# Patient Record
Sex: MALE | Race: WHITE | NOT HISPANIC OR LATINO | URBAN - METROPOLITAN AREA
[De-identification: names, ages, dates, MRNs, and addresses within clinical notes are randomized per-mention and may not be internally consistent; named-entity substitution may affect disease eponyms.]

---

## 2017-02-26 DIAGNOSIS — K21.9 GASTROESOPHAGEAL REFLUX DISEASE WITHOUT ESOPHAGITIS: ICD-10-CM

## 2017-02-26 NOTE — TELEPHONE ENCOUNTER
omeprazole (PRILOSEC) 20 MG capsule      Last Written Prescription Date: 09/06/16  Last Fill Quantity: 90 cap,  # refills: 0   Last Office Visit with [facility], [facility] or [facility] prescribing provider: 09/06/16

## 2017-08-12 ENCOUNTER — OFFICE VISIT (OUTPATIENT)
Dept: URGENT CARE | Facility: URGENT CARE | Age: 57
End: 2017-08-12
Payer: MEDICAID

## 2017-08-12 VITALS
SYSTOLIC BLOOD PRESSURE: 142 MMHG | OXYGEN SATURATION: 97 % | TEMPERATURE: 97.8 F | HEIGHT: 71 IN | DIASTOLIC BLOOD PRESSURE: 80 MMHG | BODY MASS INDEX: 29.4 KG/M2 | HEART RATE: 75 BPM | WEIGHT: 210 LBS

## 2017-08-12 DIAGNOSIS — H61.23 BILATERAL IMPACTED CERUMEN: Primary | ICD-10-CM

## 2017-08-12 PROCEDURE — 99213 OFFICE O/P EST LOW 20 MIN: CPT | Performed by: FAMILY MEDICINE

## 2017-08-12 NOTE — PROGRESS NOTES
"SUBJECTIVE:  Hugo Zepeda is a 57 year old male who presents with bilateral ear blockage with hearing loss and discomfort for 2 day(s).  Right ear was about 2-3 days ago while left ear has been 1 month.  Severity: moderate   Timing:gradual onset and worsening  Additional symptoms include none.      History of recurrent otitis: no    Last time had ear wash was 3-4 years ago.    Past Medical History:   Diagnosis Date     Unspecified schizophrenia, chronic condition with acute exacerbation     Micky-Tasks unlimited     Current Outpatient Prescriptions   Medication Sig Dispense Refill     omeprazole (PRILOSEC) 20 MG CR capsule Take 1 capsule (20 mg) by mouth daily 90 capsule 1     TRILAFON 8 MG OR TABS 52mg at hs       RISPERDAL OR 3 mg daily       COGENTIN 2 MG OR TABS 1 TABLET DAILY 30 0     Social History   Substance Use Topics     Smoking status: Former Smoker     Packs/day: 1.00     Years: 20.00     Types: Cigarettes     Quit date: 10/1/2011     Smokeless tobacco: Never Used     Alcohol use No       ROS:   CONSTITUTIONAL:NEGATIVE for fever, chills, change in weight  ENT/MOUTH: NEGATIVE for ear, mouth and throat problems and POSITIVE for earache and hearing loss  RESP:NEGATIVE for significant cough or SOB  CV: NEGATIVE for chest pain, palpitations or peripheral edema  GI: NEGATIVE for nausea, abdominal pain, heartburn, or change in bowel habits    OBJECTIVE:  /80 (BP Location: Left arm, Patient Position: Chair, Cuff Size: Adult Large)  Pulse 75  Temp 97.8  F (36.6  C) (Oral)  Ht 5' 10.5\" (1.791 m)  Wt 210 lb (95.3 kg)  SpO2 97%  BMI 29.71 kg/m2   EXAM:  The right TM is normal: no effusions, no erythema, and normal landmarks after removal of cerumen    The right auditory canal is obstructed with cerumen  The left TM is normal: no effusions, no erythema, and normal landmarks after removal of cerumen  The left auditory canal is obstructed with cerumen  Oropharynx exam is normal: no lesions, erythema, " adenopathy or exudate.  GENERAL: no acute distress  SKIN: no suspicious lesions or rashes   PSYCH: mentation appears normal and affect normal/bright    ASSESSMENT/PLAN:  (H61.23) Bilateral impacted cerumen  (primary encounter diagnosis)  Comment: successful irrigation  Plan: bilateral ear irrigation      Bilateral impacted cerumen, successful irrigation by nursing staff.  Patient reports improvement of symptoms.  Encourage to refrain from using Q-tips.    Return to clinic if any further concerns.    Eulogio Jurado MD  August 12, 2017 11:17 AM

## 2017-08-12 NOTE — MR AVS SNAPSHOT
"              After Visit Summary   2017    Hugo Zepeda    MRN: [id number]           Patient Information     Date Of Birth          1960        Visit Information        Provider Department      2017 10:40 AM Eulogio Jurado MD [facility]        Today's Diagnoses     Bilateral impacted cerumen    -  1       Follow-ups after your visit        Follow-up notes from your care team     Return if symptoms worsen or fail to improve.      Who to contact     If you have questions or need follow up information about today's clinic visit or your schedule please contact [facility] directly at [phone].  Normal or non-critical lab and imaging results will be communicated to you by [employer], letter or phone within 4 business days after the clinic has received the results. If you do not hear from us within 7 days, please contact the clinic through [employer] or phone. If you have a critical or abnormal lab result, we will notify you by phone as soon as possible.  Submit refill requests through [employer] or call your pharmacy and they will forward the refill request to us. Please allow 3 business days for your refill to be completed.          Additional Information About Your Visit        MyChart Information     [employer] lets you send messages to your doctor, view your test results, renew your prescriptions, schedule appointments and more. To sign up, go to [email or web address] . Click on \"Log in\" on the left side of the screen, which will take you to the Welcome page. Then click on \"Sign up Now\" on the right side of the page.     You will be asked to enter the access code listed below, as well as some personal information. Please follow the directions to create your username and password.     Your access code is: [email or web address]  Expires: 11/10/2017 11:18 AM     Your access code will  in 90 days. If you need help or a new code, please call your [place] clinic or " "[phone].        Care EveryWhere ID     This is your Care EveryWhere ID. This could be used by other organizations to access your [facility] medical records  [id number]        Your Vitals Were     Pulse Temperature Height Pulse Oximetry BMI (Body Mass Index)       75 97.8  F (36.6  C) (Oral) 5' 10.5\" (1.791 m) 97% 29.71 kg/m2        Blood Pressure from Last 3 Encounters:   08/12/17 142/80   11/10/16 (!) 118/95   09/06/16 122/72    Weight from Last 3 Encounters:   08/12/17 210 lb (95.3 kg)   11/10/16 204 lb (92.5 kg)   09/06/16 205 lb 12.8 oz (93.4 kg)              Today, you had the following     No orders found for display       Primary Care Provider Office Phone # Fax #    Arsh Sommer [phone]       [address] [place] [address]        Equal Access to Services     [facility]: Hadii nancy ku hadasho Soomaali, waaxda luqadaha, qaybta kaalmada adeegyada, waxfausto balbuena . So [facility] [phone].    ATENCIÓN: Si habla español, tiene a cardozo disposición servicios gratuitos de asistencia lingüística. Llame al [phone].    We comply with applicable federal civil rights laws and [place] laws. We do not discriminate on the basis of race, color, national origin, age, disability sex, sexual orientation or gender identity.            Thank you!     Thank you for choosing [facility]  for your care. Our goal is always to provide you with excellent care. Hearing back from our patients is one way we can continue to improve our services. Please take a few minutes to complete the written survey that you may receive in the mail after your visit with us. Thank you!             Your Updated Medication List - Protect others around you: Learn how to safely use, store and throw away your medicines at www.disposemymeds.org.          This list is accurate as of: 8/12/17 11:18 AM.  Always use your most recent med list.                   Brand Name " Dispense Instructions for use Diagnosis    COGENTIN 2 MG Tabs     30    1 TABLET DAILY        omeprazole 20 MG CR capsule    priLOSEC    90 capsule    Take 1 capsule (20 mg) by mouth daily    Gastroesophageal reflux disease without esophagitis       RISPERDAL PO      3 mg daily        TRILAFON 8 MG Tabs      52mg at hs

## 2017-08-12 NOTE — NURSING NOTE
"Hugo Zepead;   Chief Complaint   Patient presents with     Ear Problem     bilateral ear plugging, unable to hear      Urgent Care     Initial /80 (BP Location: Left arm, Patient Position: Chair, Cuff Size: Adult Large)  Pulse 75  Temp 97.8  F (36.6  C) (Oral)  Ht 5' 10.5\" (1.791 m)  Wt 210 lb (95.3 kg)  SpO2 97%  BMI 29.71 kg/m2 Estimated body mass index is 29.71 kg/(m^2) as calculated from the following:    Height as of this encounter: 5' 10.5\" (1.791 m).    Weight as of this encounter: 210 lb (95.3 kg)..  BP completed using cuff size regular.  Miranda Clarke R.N.  "

## 2017-08-24 DIAGNOSIS — K21.9 GASTROESOPHAGEAL REFLUX DISEASE WITHOUT ESOPHAGITIS: ICD-10-CM

## 2017-08-25 NOTE — TELEPHONE ENCOUNTER
omeprazole      Last Written Prescription Date: 02/27/17  Last Fill Quantity: 90,  # refills: 1   Last Office Visit with G, [facility] or [facility] prescribing provider: 09/06/16

## 2017-08-28 NOTE — TELEPHONE ENCOUNTER
Medication is being filled for 1 time refill only due to:  Patient needs to be seen because will be due for office visit.

## 2017-09-24 DIAGNOSIS — K21.9 GASTROESOPHAGEAL REFLUX DISEASE WITHOUT ESOPHAGITIS: ICD-10-CM

## 2017-09-25 NOTE — TELEPHONE ENCOUNTER
omeprazole      Last Written Prescription Date: 08/28/17  Last Fill Quantity: 30,  # refills: 0   Last Office Visit with G, [facility] or [facility] prescribing provider: 09/06/16

## 2017-10-06 ENCOUNTER — RADIANT APPOINTMENT (OUTPATIENT)
Dept: GENERAL RADIOLOGY | Facility: CLINIC | Age: 57
End: 2017-10-06
Attending: PHYSICIAN ASSISTANT
Payer: MEDICAID

## 2017-10-06 ENCOUNTER — OFFICE VISIT (OUTPATIENT)
Dept: FAMILY MEDICINE | Facility: CLINIC | Age: 57
End: 2017-10-06
Payer: MEDICAID

## 2017-10-06 VITALS
RESPIRATION RATE: 16 BRPM | TEMPERATURE: 98.3 F | WEIGHT: 217.5 LBS | HEIGHT: 70 IN | BODY MASS INDEX: 31.14 KG/M2 | SYSTOLIC BLOOD PRESSURE: 122 MMHG | HEART RATE: 92 BPM | DIASTOLIC BLOOD PRESSURE: 84 MMHG | OXYGEN SATURATION: 96 %

## 2017-10-06 DIAGNOSIS — Z23 NEED FOR PROPHYLACTIC VACCINATION AND INOCULATION AGAINST INFLUENZA: ICD-10-CM

## 2017-10-06 DIAGNOSIS — M25.561 ACUTE PAIN OF RIGHT KNEE: Primary | ICD-10-CM

## 2017-10-06 DIAGNOSIS — M17.11 PRIMARY OSTEOARTHRITIS OF RIGHT KNEE: ICD-10-CM

## 2017-10-06 DIAGNOSIS — M25.561 ACUTE PAIN OF RIGHT KNEE: ICD-10-CM

## 2017-10-06 PROCEDURE — 99214 OFFICE O/P EST MOD 30 MIN: CPT | Mod: 25 | Performed by: PHYSICIAN ASSISTANT

## 2017-10-06 PROCEDURE — 90471 IMMUNIZATION ADMIN: CPT | Performed by: PHYSICIAN ASSISTANT

## 2017-10-06 PROCEDURE — 90686 IIV4 VACC NO PRSV 0.5 ML IM: CPT | Performed by: PHYSICIAN ASSISTANT

## 2017-10-06 PROCEDURE — 73562 X-RAY EXAM OF KNEE 3: CPT | Mod: RT

## 2017-10-06 NOTE — NURSING NOTE
"Chief Complaint   Patient presents with     Physical     57 year old presents for physicl exam.       Initial /84 (BP Location: Left arm, Patient Position: Sitting, Cuff Size: Adult Regular)  Pulse 92  Temp 98.3  F (36.8  C)  Resp 16  Ht 5' 10.25\" (1.784 m)  Wt 217 lb 8 oz (98.7 kg)  SpO2 96%  BMI 30.99 kg/m2 Estimated body mass index is 30.99 kg/(m^2) as calculated from the following:    Height as of this encounter: 5' 10.25\" (1.784 m).    Weight as of this encounter: 217 lb 8 oz (98.7 kg).  Medication Reconciliation: complete     Patt Feliciano LPN  "

## 2017-10-06 NOTE — LETTER
[facility]  [address]  [facility] [address]  Phone: [phone]  Fax: [phone]    October 6, 2017        Hugo Zepeda  [address] [place] [address]          To whom it may concern:    RE: Hugo Zepeda    Patient was seen and treated today at our clinic. He states he was out of work due to right knee pain 10/2/17-10/4/17, please excuse him for that absence.     His plan of care and recommendations are attached.     Please contact me for questions or concerns.      Sincerely,        Nicole Joy Siegler, PA-C

## 2017-10-06 NOTE — PROGRESS NOTES
SUBJECTIVE:   CC: Hugo Zeepda is an 57 year old male who presents for preventative health visit.     Due to time constraints patient declined physical but wants evaluation of his right knee    Physical   Annual:     Getting at least 3 servings of Calcium per day::  NO    Bi-annual eye exam::  Yes    Dental care twice a year::  NO    Sleep apnea or symptoms of sleep apnea::  None    Diet::  Regular (no restrictions) and Other    Frequency of exercise::  2-3 days/week    Duration of exercise::  15-30 minutes    Barriers to taking medications::  None    Additional concerns today::  YES    Musculoskeletal problem/pain      Duration: X2-3 months with worsening pain over the past week or so    Description  Location: right knee pain    Intensity:  moderate    Accompanying signs and symptoms: none    History  Previous similar problem: no   Previous evaluation:  none    Precipitating or alleviating factors:  Trauma or overuse: no   Aggravating factors include: standing, walking and climbing stairs    Therapies tried and outcome: ice    Right knee pain worse with walking. When extended it is not painful but is more painful with flexion and weight bearing. He denies notable injury or change in activities. He does not feel popping or clicking or laxity. He hasn't heard any such sounds either. He has not noted swelling, redness, bruising. He denies numbness or tingling surrounding the site. He has no known hx of arthritis or previous xrays or other imaging in his chart.     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         ROS:  Patient denies fever, chills, nausea, vomiting, diarrhea, abdominal pain, chest pain, shortness of breath, headache, dizziness, lightheadedness.      PFSH: non contributory       Patient Active Problem List   Diagnosis     Schizophrenia, chronic condition with acute exacerbation (H)     Hypertriglyceridemia     CKD  "(chronic kidney disease) stage 3, GFR 30-59 ml/min     Family history of diabetes mellitus     ACP (advance care planning)     Non morbid obesity due to excess calories     Current Outpatient Prescriptions   Medication     omeprazole (PRILOSEC) 20 MG CR capsule     TRILAFON 8 MG OR TABS     RISPERDAL OR     COGENTIN 2 MG OR TABS     No current facility-administered medications for this visit.        OBJECTIVE:                                                    /84 (BP Location: Left arm, Patient Position: Sitting, Cuff Size: Adult Regular)  Pulse 92  Temp 98.3  F (36.8  C)  Resp 16  Ht 5' 10.25\" (1.784 m)  Wt 217 lb 8 oz (98.7 kg)  SpO2 96%  BMI 30.99 kg/m2  Body mass index is 30.99 kg/(m^2).  GEN: Alert, oriented x 3, NAD  Non-labored breathing  Pulses equal and palpable in bilateral lower extremities  SKIN: no ecchymosis, erythema, swelling, rash  NEURO: SILT in all nerve distributions of bilateral lower extremities  MS: right lower extremity appears without gross deformity of fracture or dislocation. TTP over the medial joint line and patellar poles, NO TTP over the lateral joint line, popliteal, patellar bursa or tendon, . Full A/PROM without pain of the RLE. Strength 5/5 and equal bilaterally in lower extremities.   Knee: patella tracking normally, no patella apprehension. McMurrays and Apleys tests negative. Lachman negative. Anterior and posterior drawer tests negative. Stable to varus and valgus testing. Quadraceps tendon intact, Patellar tendon intact and without inflammation.     Answers for HPI/ROS submitted by the patient on 10/6/2017   PHQ-2 Score: 0    Diagnostic test results: Xray -  mild DJD changes per my read; no acute findings. Pending radiology     ASSESSMENT/PLAN:                                                          ICD-10-CM    1. Acute pain of right knee M25.561 XR Knee Right 3 Views   2. Primary osteoarthritis of right knee M17.11    3. Need for prophylactic vaccination and " inoculation against influenza Z23 Vaccine Administration, Initial [48450]     FLU VAC, SPLIT VIRUS IM > 3 YO (QUADRIVALENT) [81301]     Vaccine Administration, Initial [10941]     Discussed likely acute irritation of the knee with mild osteoarthritis and recommended activity adjustments as per paperwork- see epic, for a few weeks to allow for things to calm down. Instructed also rest, ice, elevation, wear of brace or ace bandage may also help feel more comfortable. Return if not improving as anticipated.     Nicole Joy Siegler, PA-C  [facility]        Injectable Influenza Immunization Documentation    1.  Is the person to be vaccinated sick today?   No    2. Does the person to be vaccinated have an allergy to a component   of the vaccine?   No    3. Has the person to be vaccinated ever had a serious reaction   to influenza vaccine in the past?   No    4. Has the person to be vaccinated ever had Guillain-Barré syndrome?   No    Form completed by Patt Feliciano LPN

## 2017-10-06 NOTE — MR AVS SNAPSHOT
"              After Visit Summary   10/6/2017    Hugo Zepeda    MRN: [id number]           Patient Information     Date Of Birth          1960        Visit Information        Provider Department      10/6/2017 11:50 AM Siegler, Nicole Joy, PA-C [facility]        Today's Diagnoses     Acute pain of right knee    -  1    Primary osteoarthritis of right knee        Need for prophylactic vaccination and inoculation against influenza           Follow-ups after your visit        Your next 10 appointments already scheduled     Oct 12, 2017  3:00 PM CDT   [facility]    [address] [address] [phone]              Who to contact     If you have questions or need follow up information about today's clinic visit or your schedule please contact [facility] directly at [phone].  Normal or non-critical lab and imaging results will be communicated to you by MyChart, letter or phone within 4 business days after the clinic has received the results. If you do not hear from us within 7 days, please contact the clinic through [employer] or phone. If you have a critical or abnormal lab result, we will notify you by phone as soon as possible.  Submit refill requests through [employer] or call your pharmacy and they will forward the refill request to us. Please allow 3 business days for your refill to be completed.          Additional Information About Your Visit        [employer] Information     [employer] lets you send messages to your doctor, view your test results, renew your prescriptions, schedule appointments and more. To sign up, go to [email or web address] . Click on \"Log in\" on the left side of the screen, which will take you to the Welcome page. Then click on \"Sign up Now\" on the right side of the page.     You will be asked to enter the " "access code listed below, as well as some personal information. Please follow the directions to create your username and password.     Your access code is: [email or web address]  Expires: 11/10/2017 11:18 AM     Your access code will  in 90 days. If you need help or a new code, please call your [place] clinic or [phone].        Care EveryWhere ID     This is your Care EveryWhere ID. This could be used by other organizations to access your [place] medical records  [id number]        Your Vitals Were     Pulse Temperature Respirations Height Pulse Oximetry BMI (Body Mass Index)    92 98.3  F (36.8  C) 16 5' 10.25\" (1.784 m) 96% 30.99 kg/m2       Blood Pressure from Last 3 Encounters:   10/06/17 122/84   17 142/80   11/10/16 (!) 118/95    Weight from Last 3 Encounters:   10/06/17 217 lb 8 oz (98.7 kg)   17 210 lb (95.3 kg)   11/10/16 204 lb (92.5 kg)              We Performed the Following     FLU VAC, SPLIT VIRUS IM > 3 YO (QUADRIVALENT) [27313]     Vaccine Administration, Initial [08398]     Vaccine Administration, Initial [53189]        Primary Care Provider Office Phone # Fax #    Arsh Sommer [phone]       [address] [facility] [address]        Equal Access to Services     NICOLE GALICIA [facility]: Hadii nancy castilloo Soangie, waaxda luqadaha, qaybta kaalmada monica, hollis mercado. So [facility] [phone].    ATENCIÓN: Si habla lizzañol, tiene a cardozo disposición servicios gratuitos de asistencia lingüística. Llame al [phone].    We comply with applicable federal civil rights laws and [place] laws. We do not discriminate on the basis of race, color, national origin, age, disability, sex, sexual orientation, or gender identity.            Thank you!     Thank you for choosing [facility]  for your care. Our goal is always to provide you with excellent care. Hearing back from our patients is one way we can continue " to improve our services. Please take a few minutes to complete the written survey that you may receive in the mail after your visit with us. Thank you!             Your Updated Medication List - Protect others around you: Learn how to safely use, store and throw away your medicines at www.disposemymeds.org.          This list is accurate as of: 10/6/17 11:59 PM.  Always use your most recent med list.                   Brand Name Dispense Instructions for use Diagnosis    COGENTIN 2 MG Tabs     30    1 TABLET DAILY        omeprazole 20 MG CR capsule    priLOSEC    30 capsule    TAKE 1 CAPSULE BY MOUTH ONCE DAILY    Gastroesophageal reflux disease without esophagitis       RISPERDAL PO      3 mg daily        TRILAFON 8 MG Tabs      52mg at hs

## 2017-10-06 NOTE — PROGRESS NOTES
Injectable Influenza Immunization Documentation    1.  Is the person to be vaccinated sick today?   No    2. Does the person to be vaccinated have an allergy to a component   of the vaccine?   No    3. Has the person to be vaccinated ever had a serious reaction   to influenza vaccine in the past?   No    4. Has the person to be vaccinated ever had Guillain-Barré syndrome?   No    Form completed by Rose Manuel CMA

## 2017-10-12 ENCOUNTER — OFFICE VISIT (OUTPATIENT)
Dept: INTERNAL MEDICINE | Facility: CLINIC | Age: 57
End: 2017-10-12
Payer: MEDICAID

## 2017-10-12 VITALS
WEIGHT: 222.4 LBS | TEMPERATURE: 98.4 F | BODY MASS INDEX: 31.68 KG/M2 | SYSTOLIC BLOOD PRESSURE: 120 MMHG | DIASTOLIC BLOOD PRESSURE: 84 MMHG | OXYGEN SATURATION: 96 % | HEART RATE: 93 BPM

## 2017-10-12 DIAGNOSIS — M25.461 EFFUSION OF RIGHT KNEE: ICD-10-CM

## 2017-10-12 DIAGNOSIS — K21.9 GASTROESOPHAGEAL REFLUX DISEASE WITHOUT ESOPHAGITIS: ICD-10-CM

## 2017-10-12 DIAGNOSIS — M17.11 PRIMARY OSTEOARTHRITIS OF RIGHT KNEE: ICD-10-CM

## 2017-10-12 DIAGNOSIS — N18.30 CKD (CHRONIC KIDNEY DISEASE) STAGE 3, GFR 30-59 ML/MIN (H): ICD-10-CM

## 2017-10-12 DIAGNOSIS — F20.9 SCHIZOPHRENIA, CHRONIC CONDITION WITH ACUTE EXACERBATION (H): ICD-10-CM

## 2017-10-12 DIAGNOSIS — Z00.00 ROUTINE GENERAL MEDICAL EXAMINATION AT A HEALTH CARE FACILITY: Primary | ICD-10-CM

## 2017-10-12 PROCEDURE — 99396 PREV VISIT EST AGE 40-64: CPT | Performed by: INTERNAL MEDICINE

## 2017-10-12 NOTE — PATIENT INSTRUCTIONS
Please schedule fasting labs     No NSAIDS- (ibuprofen, aleve)    For Osteoarthritis:  1. Try taking 1000 mg (2 500 mg tablets) of tylenol/acetaminophen twice daily    Sports Medicine- see referral

## 2017-10-12 NOTE — NURSING NOTE
"Chief Complaint   Patient presents with     Physical       Initial /84  Pulse 93  Temp 98.4  F (36.9  C) (Oral)  Wt 222 lb 6.4 oz (100.9 kg)  SpO2 96%  BMI 31.68 kg/m2 Estimated body mass index is 31.68 kg/(m^2) as calculated from the following:    Height as of 10/6/17: 5' 10.25\" (1.784 m).    Weight as of this encounter: 222 lb 6.4 oz (100.9 kg).  Medication Reconciliation: complete    "

## 2017-10-12 NOTE — MR AVS SNAPSHOT
After Visit Summary   10/12/2017    Hugo Zepeda    MRN: [id number]           Patient Information     Date Of Birth          1960        Visit Information        Provider Department      10/12/2017 3:00 PM Arsh Sommer MD [facility]        Today's Diagnoses     Effusion of left knee    -  1      Care Instructions    Please schedule fasting labs     No NSAIDS- (ibuprofen, aleve)    For Osteoarthritis:  1. Try taking 1000 mg (2 500 mg tablets) of tylenol/acetaminophen twice daily    Sports Medicine- see referral          Follow-ups after your visit        Additional Services     SPORTS MEDICINE REFERRAL       Your provider has referred you to:  [facility]: [facility] [place] [facility]  [facility]  [phone]   [email or web address]/  [place] -  [facility]  [phone]   [email or web address]/    Please be aware that coverage of these services is subject to the terms and limitations of your health insurance plan.  Call member services at your health plan with any benefit or coverage questions.      Please bring the following to your appointment:    >>   Any x-rays, CTs or MRIs which have been performed.  Contact the facility where they were done to arrange for  prior to your scheduled appointment.    >>   List of current medications   >>   This referral request   >>   Any documents/labs given to you for this referral                  Who to contact     If you have questions or need follow up information about today's clinic visit or your schedule please contact [facility] directly at [phone].  Normal or non-critical lab and imaging results will be communicated to you by MyChart, letter or phone within 4 business days after the clinic has received the  "results. If you do not hear from us within 7 days, please contact the clinic through YouTab or phone. If you have a critical or abnormal lab result, we will notify you by phone as soon as possible.  Submit refill requests through YouTab or call your pharmacy and they will forward the refill request to us. Please allow 3 business days for your refill to be completed.          Additional Information About Your Visit        [employer] Information     YouTab lets you send messages to your doctor, view your test results, renew your prescriptions, schedule appointments and more. To sign up, go to [email or web address] . Click on \"Log in\" on the left side of the screen, which will take you to the Welcome page. Then click on \"Sign up Now\" on the right side of the page.     You will be asked to enter the access code listed below, as well as some personal information. Please follow the directions to create your username and password.     Your access code is: [email or web address]  Expires: 11/10/2017 11:18 AM     Your access code will  in 90 days. If you need help or a new code, please call your [place] clinic or [phone].        Care EveryWhere ID     This is your Care EveryWhere ID. This could be used by other organizations to access your [place] medical records  [id number]        Your Vitals Were     Pulse Temperature Pulse Oximetry BMI (Body Mass Index)          93 98.4  F (36.9  C) (Oral) 96% 31.68 kg/m2         Blood Pressure from Last 3 Encounters:   10/12/17 120/84   10/06/17 122/84   17 142/80    Weight from Last 3 Encounters:   10/12/17 222 lb 6.4 oz (100.9 kg)   10/06/17 217 lb 8 oz (98.7 kg)   17 210 lb (95.3 kg)              We Performed the Following     SPORTS MEDICINE REFERRAL        Primary Care Provider Office Phone # Fax #    Arsh Sommer [phone]       [address] [facility] [address]        Equal Access to Services     NICOLE GALICIA AH: Gautam wang " lili Mike, lissethda ludanielleadaha, qamariannta kaaurelia anderson, hollis flakitain hayaan laquitaisac zoltansyed lamaryanaandres rogelio. So [facility] [phone].    ATENCIÓN: Si habla español, tiene a cardozo disposición servicios gratuitos de asistencia lingüística. Melody al [phone].    We comply with applicable federal civil rights laws and [place] laws. We do not discriminate on the basis of race, color, national origin, age, disability, sex, sexual orientation, or gender identity.            Thank you!     Thank you for choosing [facility]  for your care. Our goal is always to provide you with excellent care. Hearing back from our patients is one way we can continue to improve our services. Please take a few minutes to complete the written survey that you may receive in the mail after your visit with us. Thank you!             Your Updated Medication List - Protect others around you: Learn how to safely use, store and throw away your medicines at www.disposemymeds.org.          This list is accurate as of: 10/12/17  3:40 PM.  Always use your most recent med list.                   Brand Name Dispense Instructions for use Diagnosis    COGENTIN 2 MG Tabs     30    1 TABLET DAILY        omeprazole 20 MG CR capsule    priLOSEC    30 capsule    TAKE 1 CAPSULE BY MOUTH ONCE DAILY    Gastroesophageal reflux disease without esophagitis       RISPERDAL PO      3 mg daily        TRILAFON 8 MG Tabs      52mg at hs

## 2017-10-12 NOTE — PROGRESS NOTES
SUBJECTIVE:   CC: Hugo Zepeda is an 57 year old male who presents for preventative health visit.     Physical   Annual:     Getting at least 3 servings of Calcium per day::  NO    Bi-annual eye exam::  Yes    Dental care twice a year::  NO    Sleep apnea or symptoms of sleep apnea::  None    Diet::  Regular (no restrictions)    Frequency of exercise::  None    Taking medications regularly::  Yes    Medication side effects::  None    Additional concerns today::  No      Today's PHQ-2 Score:   PHQ-2 ( 1999 Pfizer) 10/12/2017   Q1: Little interest or pleasure in doing things 0   Q2: Feeling down, depressed or hopeless 0   PHQ-2 Score 0   Q1: Little interest or pleasure in doing things Not at all   Q2: Feeling down, depressed or hopeless Not at all   PHQ-2 Score 0       Abuse: Current or Past(Physical, Sexual or Emotional)- No  Do you feel safe in your environment - Yes    Social History   Substance Use Topics     Smoking status: Former Smoker     Packs/day: 1.00     Years: 20.00     Types: Cigarettes     Quit date: 10/1/2011     Smokeless tobacco: Never Used     Alcohol use No     The patient does not drink >3 drinks per day nor >7 drinks per week.    Last PSA: No results found for: PSA    Reviewed orders with patient. Reviewed health maintenance and updated orders accordingly - Yes    Reviewed and updated as needed this visit by clinical staff  Tobacco  Allergies  Meds  Problems         Reviewed and updated as needed this visit by Provider  Allergies  Meds  Problems              ROS:  C: NEGATIVE for fever, chills, change in weight  I: NEGATIVE for worrisome rashes, moles or lesions  E: NEGATIVE for vision changes or irritation  ENT: NEGATIVE for ear, mouth and throat problems  R: NEGATIVE for significant cough or SOB  CV: NEGATIVE for chest pain, palpitations or peripheral edema  GI: NEGATIVE for nausea, abdominal pain, heartburn, or change in bowel habits   male: negative for dysuria, hematuria, decreased  urinary stream, erectile dysfunction, urethral discharge  M: NEGATIVE for significant arthralgias or myalgia  N: NEGATIVE for weakness, dizziness or paresthesias  P: NEGATIVE for changes in mood or affect    OBJECTIVE:   /84  Pulse 93  Temp 98.4  F (36.9  C) (Oral)  Wt 222 lb 6.4 oz (100.9 kg)  SpO2 96%  BMI 31.68 kg/m2    EXAM:  GENERAL: alert, no distress and over weight  EYES: Eyes grossly normal to inspection, PERRL and conjunctivae and sclerae normal  HENT: ear canals and TM's normal, nose and mouth without ulcers or lesions  NECK: no adenopathy, no asymmetry, masses, or scars and thyroid normal to palpation  RESP: lungs clear to auscultation - no rales, rhonchi or wheezes  CV: regular rate and rhythm, normal S1 S2, no S3 or S4, no murmur, click or rub, no peripheral edema and peripheral pulses strong  ABDOMEN: soft, nontender, no hepatosplenomegaly, no masses and bowel sounds normal  MS: no gross musculoskeletal defects noted, no edema  SKIN: no suspicious lesions or rashes  NEURO: Normal strength and tone, mentation intact and speech normal  PSYCH: mentation appears normal, affect normal/bright  LYMPH: no cervical, supraclavicular, axillary, or inguinal adenopathy    ASSESSMENT/PLAN:   1. (Z00.00) Routine general medical examination at a health care facility  (primary encounter diagnosis)  Plan: repeat labs. uptodate on colon ca screening. Was not interested in prostate ca screening after discussion.     (N18.3) CKD (chronic kidney disease) stage 3, GFR 30-59 ml/min  Comment: noted last yr- pt never had ordered u/s. Will first repeat the labs and then determine need to do this.   Plan: repeat labs     (M25.461) Effusion of right knee  (M17.11) Primary osteoarthritis of right knee  Comment: seems related to OA per recent visit  Plan: tylenol, avoid nsaids given CKD. If fails could go to sports med for further eval, cortisone inj         COUNSELING:   Reviewed preventive health counseling, as  "reflected in patient instructions     reports that he quit smoking about 6 years ago. His smoking use included Cigarettes. He has a 20.00 pack-year smoking history. He has never used smokeless tobacco.      Estimated body mass index is 31.68 kg/(m^2) as calculated from the following:    Height as of 10/6/17: 5' 10.25\" (1.784 m).    Weight as of this encounter: 222 lb 6.4 oz (100.9 kg).   Weight management plan: Discussed healthy diet and exercise guidelines and patient will follow up in 12 months in clinic to re-evaluate.    Counseling Resources:  ATP IV Guidelines  Pooled Cohorts Equation Calculator  FRAX Risk Assessment  ICSI Preventive Guidelines  Dietary Guidelines for Americans, 2010  USDA's MyPlate  ASA Prophylaxis  Lung CA Screening    Arsh Sommer MD  [facility]  Answers for HPI/ROS submitted by the patient on 10/12/2017   PHQ-2 Score: 0    "

## 2017-10-18 DIAGNOSIS — N18.30 CKD (CHRONIC KIDNEY DISEASE) STAGE 3, GFR 30-59 ML/MIN (H): ICD-10-CM

## 2017-10-18 DIAGNOSIS — Z00.00 ROUTINE GENERAL MEDICAL EXAMINATION AT A HEALTH CARE FACILITY: ICD-10-CM

## 2017-10-18 PROCEDURE — 80061 LIPID PANEL: CPT | Performed by: INTERNAL MEDICINE

## 2017-10-18 PROCEDURE — 80048 BASIC METABOLIC PNL TOTAL CA: CPT | Performed by: INTERNAL MEDICINE

## 2017-10-18 PROCEDURE — 36415 COLL VENOUS BLD VENIPUNCTURE: CPT | Performed by: INTERNAL MEDICINE

## 2017-10-19 LAB
ANION GAP SERPL CALCULATED.3IONS-SCNC: 10 MMOL/L (ref 3–14)
BUN SERPL-MCNC: 20 MG/DL (ref 7–30)
CALCIUM SERPL-MCNC: 9.6 MG/DL (ref 8.5–10.1)
CHLORIDE SERPL-SCNC: 106 MMOL/L (ref 94–109)
CHOLEST SERPL-MCNC: 270 MG/DL
CO2 SERPL-SCNC: 23 MMOL/L (ref 20–32)
CREAT SERPL-MCNC: 1.49 MG/DL (ref 0.66–1.25)
GFR SERPL CREATININE-BSD FRML MDRD: 49 ML/MIN/1.7M2
GLUCOSE SERPL-MCNC: 105 MG/DL (ref 70–99)
HDLC SERPL-MCNC: 54 MG/DL
LDLC SERPL CALC-MCNC: 180 MG/DL
NONHDLC SERPL-MCNC: 216 MG/DL
POTASSIUM SERPL-SCNC: 4.4 MMOL/L (ref 3.4–5.3)
SODIUM SERPL-SCNC: 139 MMOL/L (ref 133–144)
TRIGL SERPL-MCNC: 180 MG/DL

## 2017-10-27 DIAGNOSIS — K21.9 GASTROESOPHAGEAL REFLUX DISEASE WITHOUT ESOPHAGITIS: ICD-10-CM

## 2018-07-31 ENCOUNTER — OFFICE VISIT (OUTPATIENT)
Dept: FAMILY MEDICINE | Facility: CLINIC | Age: 58
End: 2018-07-31
Payer: MEDICAID

## 2018-07-31 VITALS
BODY MASS INDEX: 31.77 KG/M2 | TEMPERATURE: 98.4 F | DIASTOLIC BLOOD PRESSURE: 84 MMHG | OXYGEN SATURATION: 97 % | SYSTOLIC BLOOD PRESSURE: 127 MMHG | WEIGHT: 223 LBS | HEART RATE: 75 BPM

## 2018-07-31 DIAGNOSIS — H61.23 BILATERAL IMPACTED CERUMEN: Primary | ICD-10-CM

## 2018-07-31 PROCEDURE — 69209 REMOVE IMPACTED EAR WAX UNI: CPT | Mod: 50 | Performed by: INTERNAL MEDICINE

## 2018-07-31 NOTE — PROGRESS NOTES
SUBJECTIVE:   Hugo Zepeda is a 58 year old male presenting with a chief complaint of   Chief Complaint   Patient presents with     Ear Problem     Pt in clinic to have eval  for plugged ears.       He is an established patient of [facility].      Onset of symptoms was 1 month(s) ago.  Course of illness is worsening.    Current and Associated symptoms: Plugged ears.  It is affecting his hearing.  He thinks he needs it earwax irrigation.  He has this performed every few years  Treatment measures tried include None tried.  Predisposing factors include- denies cold symptoms.        Review of Systems    Past Medical History:   Diagnosis Date     Unspecified schizophrenia, chronic condition with acute exacerbation     Micky-Tasks unlimited     Family History   Problem Relation Age of Onset     Diabetes Mother      Diabetes Father      C.A.D. No family hx of      Hypertension No family hx of      Coronary Artery Disease No family hx of      Hyperlipidemia No family hx of      Cerebrovascular Disease No family hx of      Breast Cancer No family hx of      Colon Cancer No family hx of      Prostate Cancer No family hx of      Other Cancer No family hx of      Depression No family hx of      Anxiety Disorder No family hx of      Mental Illness No family hx of      Substance Abuse No family hx of      Anesthesia Reaction No family hx of      Asthma No family hx of      Osteoperosis No family hx of      Genetic Disorder No family hx of      Thyroid Disease No family hx of      Obesity No family hx of      Current Outpatient Prescriptions   Medication Sig Dispense Refill     COGENTIN 2 MG OR TABS 1 TABLET DAILY 30 0     omeprazole (PRILOSEC) 20 MG CR capsule TAKE 1 CAPSULE BY MOUTH ONCE DAILY 90 capsule 3     RISPERDAL OR 3 mg daily       TRILAFON 8 MG OR TABS 52mg at hs       Social History   Substance Use Topics     Smoking status: Former Smoker     Packs/day: 1.00     Years: 20.00     Types: Cigarettes     Quit date:  10/1/2011     Smokeless tobacco: Never Used     Alcohol use No       OBJECTIVE  /84  Pulse 75  Temp 98.4  F (36.9  C) (Oral)  Wt 223 lb (101.2 kg)  SpO2 97%  BMI 31.77 kg/m2    Physical Exam   Constitutional: He appears well-developed and well-nourished.   HENT:   Bilateral earwax impaction in both external ear canals.  Nurse irrigation bilateral ears canals performed   Vitals reviewed.    Reexamination of bilateral ear canals show removal of bilateral wax impaction.  Tympanic membranes appear normal bilaterally  Labs:  No results found for this or any previous visit (from the past 24 hour(s)).        ASSESSMENT:      ICD-10-CM    1. Bilateral impacted cerumen H61.23 HC REMOVAL IMPACTED CERUMEN IRRIGATION/LVG UNILAT     HC REMOVAL IMPACTED CERUMEN IRRIGATION/LVG UNILAT          PLAN:  Nursing irrigation performed bilaterally successful.      Followup:    If not improving or if condition worsens, follow up with your Primary Care Provider

## 2018-07-31 NOTE — MR AVS SNAPSHOT
"              After Visit Summary   2018    Hugo Zepeda    MRN: [id number]           Patient Information     Date Of Birth          1960        Visit Information        Provider Department      2018 4:00 PM Sushma Russo MD [facility]        Today's Diagnoses     Bilateral impacted cerumen    -  1       Follow-ups after your visit        Who to contact     If you have questions or need follow up information about today's clinic visit or your schedule please contact [facility] directly at [phone].  Normal or non-critical lab and imaging results will be communicated to you by WiLinxhart, letter or phone within 4 business days after the clinic has received the results. If you do not hear from us within 7 days, please contact the clinic through WiLinxhart or phone. If you have a critical or abnormal lab result, we will notify you by phone as soon as possible.  Submit refill requests through INFUSD or call your pharmacy and they will forward the refill request to us. Please allow 3 business days for your refill to be completed.          Additional Information About Your Visit        MyChart Information     INFUSD lets you send messages to your doctor, view your test results, renew your prescriptions, schedule appointments and more. To sign up, go to [email or web address] . Click on \"Log in\" on the left side of the screen, which will take you to the Welcome page. Then click on \"Sign up Now\" on the right side of the page.     You will be asked to enter the access code listed below, as well as some personal information. Please follow the directions to create your username and password.     Your access code is: [email or web address]  Expires: 10/29/2018  4:50 PM     Your access code will  in 90 days. If you need help or a new code, please call your [facility] or [phone].        Care EveryWhere ID     This is your Care EveryWhere ID. This could be " used by other organizations to access your [facility] medical records  [id number]        Your Vitals Were     Pulse Temperature Pulse Oximetry BMI (Body Mass Index)          75 98.4  F (36.9  C) (Oral) 97% 31.77 kg/m2         Blood Pressure from Last 3 Encounters:   07/31/18 127/84   10/12/17 120/84   10/06/17 122/84    Weight from Last 3 Encounters:   07/31/18 223 lb (101.2 kg)   10/12/17 222 lb 6.4 oz (100.9 kg)   10/06/17 217 lb 8 oz (98.7 kg)              We Performed the Following     HC REMOVAL IMPACTED CERUMEN IRRIGATION/LVG UNILAT     HC REMOVAL IMPACTED CERUMEN IRRIGATION/LVG UNILAT        Primary Care Provider Office Phone # Fax #    Arsh Sommer [phone]       [address] [place] [id number]        Equal Access to Services     NICOLE GALICIA : Hadii nancy ku hadasho Soangie, waaxda luqadaha, qaybta kaalmada adeegyada, hollis ablbuena . So [facility] [phone].    ATENCIÓN: Si habla español, tiene a cardozo disposición servicios gratuitos de asistencia lingüística. Llame al [phone].    We comply with applicable federal civil rights laws and [place] laws. We do not discriminate on the basis of race, color, national origin, age, disability, sex, sexual orientation, or gender identity.            Thank you!     Thank you for choosing [facility]  for your care. Our goal is always to provide you with excellent care. Hearing back from our patients is one way we can continue to improve our services. Please take a few minutes to complete the written survey that you may receive in the mail after your visit with us. Thank you!             Your Updated Medication List - Protect others around you: Learn how to safely use, store and throw away your medicines at www.disposemymeds.org.          This list is accurate as of 7/31/18  4:50 PM.  Always use your most recent med list.                   Brand Name Dispense Instructions for use Diagnosis     COGENTIN 2 MG Tabs     30    1 TABLET DAILY        omeprazole 20 MG CR capsule    priLOSEC    90 capsule    TAKE 1 CAPSULE BY MOUTH ONCE DAILY    Gastroesophageal reflux disease without esophagitis       RISPERDAL PO      3 mg daily        TRILAFON 8 MG Tabs      52mg at hs

## 2018-10-17 DIAGNOSIS — K21.9 GASTROESOPHAGEAL REFLUX DISEASE WITHOUT ESOPHAGITIS: ICD-10-CM

## 2018-10-17 NOTE — LETTER
[facility]  [address] [id number] [phone]            Hugo Zepeda  [address] [place] [address]        October 17, 2018    Dear Jose,    While refilling your prescription today, we noticed that you are due to have labs drawn and see your provider.  We will refill your prescription for 30 days, but a follow-up appointment must be made before any additional refills can be approved.     Taking care of your health is important to us and we look forward to seeing you in the near future.  Please call us at [phone] or [phone] (or use SheerID) to schedule an appointment.     Please disregard this notice if you have already made an appointment.    Sincerely,        [facility]

## 2018-10-17 NOTE — TELEPHONE ENCOUNTER
"Requested Prescriptions   Pending Prescriptions Disp Refills     omeprazole (PRILOSEC) 20 MG CR capsule [Pharmacy Med Name: OMEPRAZOLE 20MG CAPSULES] 90 capsule 0     Sig: TAKE 1 CAPSULE BY MOUTH EVERY DAY    PPI Protocol Failed    10/17/2018  4:46 PM       Failed - Recent (12 mo) or future (30 days) visit within the authorizing provider's specialty    Patient had office visit in the last 12 months or has a visit in the next 30 days with authorizing provider or within the authorizing provider's specialty.  See \"Patient Info\" tab in inbasket, or \"Choose Columns\" in Meds & Orders section of the refill encounter.             Passed - Not on Clopidogrel (unless Pantoprazole ordered)       Passed - No diagnosis of osteoporosis on record       Passed - Patient is age 18 or older          Last Written Prescription Date:  10/27/2017  Last Fill Quantity: 90,  # refills: 3   Last office visit: 10/12/2017 with prescribing provider:  Arsh Sommer    Future Office Visit:      Medication is being filled for 1 time refill only due to:  Patient needs to be seen because due for annual physical.   Letter Sent.     Prescription approved per [facility] Refill Protocol.    Yumiko GOODEN RN, BSN, PHN      "

## 2019-01-21 DIAGNOSIS — K21.9 GASTROESOPHAGEAL REFLUX DISEASE WITHOUT ESOPHAGITIS: ICD-10-CM

## 2019-01-21 NOTE — LETTER
[facility]  [address] [id number] [phone]            Hugo Zepeda  [address] [place] [address]        January 22, 2019    Dear Jose,    While refilling your prescription today, we noticed that you are due for an appointment with your provider.  We will refill your prescription for 30 days, but a follow-up appointment must be made before any additional refills can be approved.     Taking care of your health is important to us and we look forward to seeing you in the near future.  Please call us at [phone] or [phone] (or use [employer]) to schedule an appointment.     Please disregard this notice if you have already made an appointment.    Sincerely,        [facility]

## 2019-01-22 NOTE — TELEPHONE ENCOUNTER
"Requested Prescriptions   Pending Prescriptions Disp Refills     omeprazole (PRILOSEC) 20 MG DR capsule [Pharmacy Med Name: OMEPRAZOLE 20MG CAPSULES]  Last Written Prescription Date:  10/17/2018  Last Fill Quantity: 90,  # refills: 0   Last Office Visit: 10/12/2017   Future Office Visit:      90 capsule 0     Sig: TAKE 1 CAPSULE BY MOUTH EVERY DAY    PPI Protocol Failed - 1/21/2019  4:31 PM       Failed - Recent (12 mo) or future (30 days) visit within the authorizing provider's specialty    Patient had office visit in the last 12 months or has a visit in the next 30 days with authorizing provider or within the authorizing provider's specialty.  See \"Patient Info\" tab in inbasket, or \"Choose Columns\" in Meds & Orders section of the refill encounter.             Passed - Not on Clopidogrel (unless Pantoprazole ordered)       Passed - No diagnosis of osteoporosis on record       Passed - Medication is active on med list       Passed - Patient is age 18 or older          "

## 2019-02-17 ENCOUNTER — OFFICE VISIT (OUTPATIENT)
Dept: URGENT CARE | Facility: URGENT CARE | Age: 59
End: 2019-02-17
Payer: MEDICAID

## 2019-02-17 ENCOUNTER — ANCILLARY PROCEDURE (OUTPATIENT)
Dept: GENERAL RADIOLOGY | Facility: CLINIC | Age: 59
End: 2019-02-17
Attending: FAMILY MEDICINE
Payer: MEDICAID

## 2019-02-17 VITALS
TEMPERATURE: 97.5 F | HEIGHT: 71 IN | RESPIRATION RATE: 16 BRPM | HEART RATE: 90 BPM | SYSTOLIC BLOOD PRESSURE: 116 MMHG | DIASTOLIC BLOOD PRESSURE: 82 MMHG | WEIGHT: 223 LBS | BODY MASS INDEX: 31.22 KG/M2

## 2019-02-17 DIAGNOSIS — M79.674 PAIN AND SWELLING OF TOE OF RIGHT FOOT: ICD-10-CM

## 2019-02-17 DIAGNOSIS — M79.674 PAIN AND SWELLING OF TOE OF RIGHT FOOT: Primary | ICD-10-CM

## 2019-02-17 DIAGNOSIS — M79.89 PAIN AND SWELLING OF TOE OF RIGHT FOOT: ICD-10-CM

## 2019-02-17 DIAGNOSIS — M79.89 PAIN AND SWELLING OF TOE OF RIGHT FOOT: Primary | ICD-10-CM

## 2019-02-17 LAB
BASOPHILS # BLD AUTO: 0 10E9/L (ref 0–0.2)
BASOPHILS NFR BLD AUTO: 0.2 %
DIFFERENTIAL METHOD BLD: NORMAL
EOSINOPHIL # BLD AUTO: 0.2 10E9/L (ref 0–0.7)
EOSINOPHIL NFR BLD AUTO: 3.7 %
ERYTHROCYTE [DISTWIDTH] IN BLOOD BY AUTOMATED COUNT: 12.7 % (ref 10–15)
HCT VFR BLD AUTO: 42.2 % (ref 40–53)
HGB BLD-MCNC: 14.5 G/DL (ref 13.3–17.7)
LYMPHOCYTES # BLD AUTO: 1 10E9/L (ref 0.8–5.3)
LYMPHOCYTES NFR BLD AUTO: 19.7 %
MCH RBC QN AUTO: 30.1 PG (ref 26.5–33)
MCHC RBC AUTO-ENTMCNC: 34.4 G/DL (ref 31.5–36.5)
MCV RBC AUTO: 88 FL (ref 78–100)
MONOCYTES # BLD AUTO: 0.5 10E9/L (ref 0–1.3)
MONOCYTES NFR BLD AUTO: 10.5 %
NEUTROPHILS # BLD AUTO: 3.4 10E9/L (ref 1.6–8.3)
NEUTROPHILS NFR BLD AUTO: 65.9 %
PLATELET # BLD AUTO: 187 10E9/L (ref 150–450)
RBC # BLD AUTO: 4.81 10E12/L (ref 4.4–5.9)
WBC # BLD AUTO: 5.1 10E9/L (ref 4–11)

## 2019-02-17 PROCEDURE — 85025 COMPLETE CBC W/AUTO DIFF WBC: CPT | Performed by: FAMILY MEDICINE

## 2019-02-17 PROCEDURE — 86140 C-REACTIVE PROTEIN: CPT | Performed by: FAMILY MEDICINE

## 2019-02-17 PROCEDURE — 36415 COLL VENOUS BLD VENIPUNCTURE: CPT | Performed by: FAMILY MEDICINE

## 2019-02-17 PROCEDURE — 99214 OFFICE O/P EST MOD 30 MIN: CPT | Performed by: FAMILY MEDICINE

## 2019-02-17 PROCEDURE — 84550 ASSAY OF BLOOD/URIC ACID: CPT | Performed by: FAMILY MEDICINE

## 2019-02-17 PROCEDURE — 73630 X-RAY EXAM OF FOOT: CPT | Mod: RT

## 2019-02-17 RX ORDER — CEPHALEXIN 500 MG/1
500 CAPSULE ORAL 2 TIMES DAILY
Qty: 20 CAPSULE | Refills: 0 | Status: SHIPPED | OUTPATIENT
Start: 2019-02-17 | End: 2019-02-27

## 2019-02-17 ASSESSMENT — MIFFLIN-ST. JEOR: SCORE: 1853.65

## 2019-02-17 NOTE — LETTER
[facility]  Emergency Room  [address]  [place].   [id number]  Tel: [phone]   Fax: [phone]  2019    Hugo Zepeda  [address] [place] [address] [phone] (home) [phone] (work)    : 1960          To Whom it May Concern:    Hugo Zepeda was seen in our clinic today, 2019. I expect his condition to improve over the next several days.  Depending on how he feels in two days patient might miss work on 2019 considering his condition. He may return to work/school, without restriction, when improved. If not improved during the above expected time period,  then I have encouraged him to be rechecked in his clinic for further evaluation.      Please contact me for questions or concerns.    Sincerely,    Quincy Severino MD

## 2019-02-17 NOTE — PROGRESS NOTES
SUBJECTIVE:   Chief Complaint   Patient presents with     Urgent Care     Musculoskeletal Problem     right foot pain and swelling since Thursday, no injury, affecting sleep.      Patient  is a 59 year old male who presented to urgent care clinic for evaluation of Swollen right foot.     Joint/Muscle Pain      Onset: Two days ago    Injury?  no    Description:   Location(s): Fight foot. He also has redness around the affected foot  Character: Sharp    Intensity: severe, 10/10    Progression of Symptoms: same    Accompanying Signs & Symptoms:  Other symptoms: He has radiation of pain to the toes. He also he also endorsed redness, warmth and swelling of the affected foot.    History:   Previous similar pain: no      Worsened by    Overuse?: no  Morning Stiffness?:no    Alleviating factors:  Improved by: acetaminophen  Therapies Tried and outcome: Tylenol as needed.  Patient also has a history of chronic kidney disease.  He denies history of gout.  He also denies fever, chills, nausea, vomiting, and weakness of the affected foot. He works as a  and cant recall any cuts to the affected foot.       Review of Systems review of system negative except as mentioned in HPI.      Past Medical History:   Diagnosis Date     Unspecified schizophrenia, chronic condition with acute exacerbation     Micky-Tasks unlimited     Family History   Problem Relation Age of Onset     Diabetes Mother      Diabetes Father      C.A.D. No family hx of      Hypertension No family hx of      Coronary Artery Disease No family hx of      Hyperlipidemia No family hx of      Cerebrovascular Disease No family hx of      Breast Cancer No family hx of      Colon Cancer No family hx of      Prostate Cancer No family hx of      Other Cancer No family hx of      Depression No family hx of      Anxiety Disorder No family hx of      Mental Illness No family hx of      Substance Abuse No family hx of      Anesthesia Reaction No family hx of       "Asthma No family hx of      Osteoporosis No family hx of      Genetic Disorder No family hx of      Thyroid Disease No family hx of      Obesity No family hx of      Current Outpatient Medications   Medication Sig Dispense Refill     COGENTIN 2 MG OR TABS 1 TABLET DAILY 30 0     omeprazole (PRILOSEC) 20 MG DR capsule TAKE 1 CAPSULE BY MOUTH EVERY DAY 30 capsule 0     RISPERDAL OR 3 mg daily       TRILAFON 8 MG OR TABS 52mg at hs       Social History     Tobacco Use     Smoking status: Former Smoker     Packs/day: 1.00     Years: 20.00     Pack years: 20.00     Types: Cigarettes     Last attempt to quit: 10/1/2011     Years since quittin.3     Smokeless tobacco: Never Used   Substance Use Topics     Alcohol use: No     Alcohol/week: 0.0 oz       OBJECTIVE  /82   Pulse 90   Temp 97.5  F (36.4  C) (Oral)   Resp 16   Ht 1.803 m (5' 11\")   Wt 101.2 kg (223 lb)   BMI 31.10 kg/m      Physical Exam   Constitutional: He appears well-developed and well-nourished. No distress.   HENT:   Head: Normocephalic and atraumatic.   Eyes: Conjunctivae are normal. Right eye exhibits no discharge. Left eye exhibits no discharge.   Neck: Neck supple.   Musculoskeletal:        Feet:    Neurological: He is alert.   Skin: Skin is warm. He is not diaphoretic.   No rash on exposed skin       Labs:  No results found for this or any previous visit (from the past 24 hour(s)).    X-Ray was not done.    ASSESSMENT:    Hugo was seen today for urgent care and musculoskeletal problem.    Diagnoses and all orders for this visit:    Pain and swelling of toe of right foot:   Patient is a 59-year-old male with a past medical history significant for CKD who presented to the clinic with acute onset of right foot pain swelling, erythema, and warmth.  Possible differentials includes cellulitis versus acute gout versus septic joint.  His physical examination was remarkable for swelling, erythema, and tenderness of the affected foot.  His " vital signs are stable. Acute Gout is likely given history of renal disease, however, given lack pathognomonic podagra presentation decision was made to treat patient cellulitis.  X-ray, uric acid, CBC, and CRP was obtained and patient will be called and prednisone will be prescribed if workup is consistent with gout.  He was advised to follow-up with his primary care physician within 2-3 days for close monitoring and evaluation.  Alarming symptoms were discussed and follow-up presentation recommended.    -     CBC with platelets and differential  -     Uric acid  -     CRP inflammation  -     Cancel: XR Foot Bilateral G/E 3 Views; Future  -     cephALEXin (KEFLEX) 500 MG capsule; Take 1 capsule (500 mg) by mouth 2 times daily for 10 days  -     XR Foot Right G/E 3 Views; Future      Quincy Severino MD

## 2019-02-17 NOTE — PATIENT INSTRUCTIONS
Patient instruction:     You were seen at the urgent care for evaluation and treatment. You were diagnosed with the following below:     Hugo was seen today for urgent care and musculoskeletal problem.    Diagnoses and all orders for this visit:    Pain and swelling of toe of right foot  -     CBC with platelets and differential  -     Uric acid  -     CRP inflammation  -     XR Foot Bilateral G/E 3 Views; Future  -     cephALEXin (KEFLEX) 500 MG capsule; Take 1 capsule (500 mg) by mouth 2 times daily for 10 days        - Please take any prescribed medication as directed.  - Please follow up with your primary care physician in 2-3 DAYS   - I will call you with lab information and if you have gout, I will prescribe Prednisone to pharmacy   - Please return back to the clinic or go to your nearest Emergency Department if you develop worsening or new symptoms such as: Persistent fever, chills, headaches, blurry vision, chest pain, shortness of breath, abdominal pain, nausea, vomiting, and diarrhea.    Quincy Severino MD

## 2019-02-18 LAB
CRP SERPL-MCNC: 52 MG/L (ref 0–8)
URATE SERPL-MCNC: 6.6 MG/DL (ref 3.5–7.2)

## 2019-02-27 ENCOUNTER — OFFICE VISIT (OUTPATIENT)
Dept: INTERNAL MEDICINE | Facility: CLINIC | Age: 59
End: 2019-02-27
Payer: MEDICAID

## 2019-02-27 VITALS
HEART RATE: 84 BPM | WEIGHT: 224 LBS | TEMPERATURE: 97.5 F | DIASTOLIC BLOOD PRESSURE: 64 MMHG | RESPIRATION RATE: 18 BRPM | OXYGEN SATURATION: 100 % | BODY MASS INDEX: 31.24 KG/M2 | SYSTOLIC BLOOD PRESSURE: 126 MMHG

## 2019-02-27 DIAGNOSIS — K21.9 GASTROESOPHAGEAL REFLUX DISEASE WITHOUT ESOPHAGITIS: ICD-10-CM

## 2019-02-27 DIAGNOSIS — N18.30 CKD (CHRONIC KIDNEY DISEASE) STAGE 3, GFR 30-59 ML/MIN (H): ICD-10-CM

## 2019-02-27 DIAGNOSIS — Z23 NEED FOR PROPHYLACTIC VACCINATION AND INOCULATION AGAINST INFLUENZA: ICD-10-CM

## 2019-02-27 DIAGNOSIS — Z23 NEED FOR TD VACCINE: ICD-10-CM

## 2019-02-27 DIAGNOSIS — M13.10: Primary | ICD-10-CM

## 2019-02-27 LAB
ANION GAP SERPL CALCULATED.3IONS-SCNC: 7 MMOL/L (ref 3–14)
BUN SERPL-MCNC: 15 MG/DL (ref 7–30)
CALCIUM SERPL-MCNC: 9.5 MG/DL (ref 8.5–10.1)
CHLORIDE SERPL-SCNC: 105 MMOL/L (ref 94–109)
CO2 SERPL-SCNC: 26 MMOL/L (ref 20–32)
CREAT SERPL-MCNC: 1.75 MG/DL (ref 0.66–1.25)
GFR SERPL CREATININE-BSD FRML MDRD: 42 ML/MIN/{1.73_M2}
GLUCOSE SERPL-MCNC: 120 MG/DL (ref 70–99)
POTASSIUM SERPL-SCNC: 4.6 MMOL/L (ref 3.4–5.3)
SODIUM SERPL-SCNC: 138 MMOL/L (ref 133–144)

## 2019-02-27 PROCEDURE — 36415 COLL VENOUS BLD VENIPUNCTURE: CPT | Performed by: INTERNAL MEDICINE

## 2019-02-27 PROCEDURE — 90682 RIV4 VACC RECOMBINANT DNA IM: CPT | Performed by: INTERNAL MEDICINE

## 2019-02-27 PROCEDURE — 90714 TD VACC NO PRESV 7 YRS+ IM: CPT | Performed by: INTERNAL MEDICINE

## 2019-02-27 PROCEDURE — 90471 IMMUNIZATION ADMIN: CPT | Performed by: INTERNAL MEDICINE

## 2019-02-27 PROCEDURE — 90472 IMMUNIZATION ADMIN EACH ADD: CPT | Performed by: INTERNAL MEDICINE

## 2019-02-27 PROCEDURE — 80048 BASIC METABOLIC PNL TOTAL CA: CPT | Performed by: INTERNAL MEDICINE

## 2019-02-27 PROCEDURE — 99214 OFFICE O/P EST MOD 30 MIN: CPT | Mod: 25 | Performed by: INTERNAL MEDICINE

## 2019-02-27 RX ORDER — PREDNISONE 20 MG/1
TABLET ORAL
Qty: 9 TABLET | Refills: 0 | Status: SHIPPED | OUTPATIENT
Start: 2019-02-27 | End: 2019-03-12

## 2019-02-27 NOTE — PROGRESS NOTES

## 2019-02-27 NOTE — PROGRESS NOTES
SUBJECTIVE:   Hugo Zepeda is a 59 year old male who presents to clinic today for the following health issues:    Concern - Foot Infection  Onset: 2/15/19    Description:   Right foot   Extreme pain - sharp     Intensity: severe    Progression of Symptoms:  improving    Accompanying Signs & Symptoms:  A little swollen   Redness     Previous history of similar problem:   none    Precipitating factors:   Worsened by: none    Alleviating factors:  Improved by: Tylenol helps a little    Therapies Tried and outcome: Tylenol     Patient seen in urgent care for first right MTP joint swelling tenderness erythema across much of the dorsal aspect of the foot and pain.  Is felt patient may have cellulitis and was treated with an antibiotic.  Over the course of treatment patient noted some gradual improvement though he states still has some tenderness in the joint and mild to moderate swelling.  He has a history of chronic kidney disease which is likely related to long-term lithium use in the past.  Recent labs revealed elevated CRP and mildly elevated uric acid level.  No prior history of gout    Problem list and histories reviewed & adjusted, as indicated.  Additional history: as documented    Labs reviewed in EPIC    Reviewed and updated as needed this visit by clinical staff  Tobacco  Allergies  Meds       Reviewed and updated as needed this visit by Provider         ROS:  Constitutional, HEENT, cardiovascular, pulmonary, gi and gu systems are negative, except as otherwise noted.    OBJECTIVE:                                                    /64   Pulse 84   Temp 97.5  F (36.4  C) (Oral)   Resp 18   Wt 101.6 kg (224 lb)   SpO2 100%   BMI 31.24 kg/m    Body mass index is 31.24 kg/m .  GENERAL APPEARANCE: alert, no distress and over weight  RESP: lungs clear to auscultation - no rales, rhonchi or wheezes  CV: regular rates and rhythm, normal S1 S2, no S3 or S4 and no murmur, click or rub  MS: Right MTP is  tender.  Some slight warmth and erythema around the joints and the dorsal aspect of the foot. Toes negative    Diagnostic test results:  none      ASSESSMENT/PLAN:                                                    1. inflammatory monoarthritis- likely gout  It is my opinion that he likely had an inflammatory monoarthritis most likely gout rather than cellulitis.  With his CKD NSAIDs are contraindicated.  I think his flare is on its way down anyway but will give him 6 days of prednisone to help this resolve.  He may stop the antibiotics as I do not think this is cellulitis  - Basic metabolic panel  - predniSONE (DELTASONE) 20 MG tablet; Take 20 mg by mouth 2 times daily for 3 days, THEN 20 mg daily for 3 days.  Dispense: 9 tablet; Refill: 0    2. CKD (chronic kidney disease) stage 3, GFR 30-59 ml/min (H)  Patient never got ultrasound done to rule out any obstructive process contributing.  This is unlikely but should be checked.  Once again this is likely related to prior lithium use  - Basic metabolic panel    3. Need for prophylactic vaccination and inoculation against influenza  -      ADMIN VACCINE, FIRST  - FLU VACCINE, (RIV4) RECOMBINANT HA  , IM (FluBlok, egg free) [96667]- >18 YRS ([facility] recommended  50-64 YRS)  - Vaccine Administration, Each Additional [06668]    4. Need for Td vaccine  Given        Arsh Sommer MD  [facility]

## 2019-02-27 NOTE — TELEPHONE ENCOUNTER
"Patient being seen for appt.    Requested Prescriptions   Pending Prescriptions Disp Refills     omeprazole (PRILOSEC) 20 MG DR capsule [Pharmacy Med Name: OMEPRAZOLE 20MG CAPSULES] 30 capsule 0     Sig: TAKE ONE CAPSULE BY MOUTH DAILY. PATIENT NEEDS TO BE SEEN    PPI Protocol Passed - 2/27/2019 10:52 AM       Passed - Not on Clopidogrel (unless Pantoprazole ordered)       Passed - No diagnosis of osteoporosis on record       Passed - Recent (12 mo) or future (30 days) visit within the authorizing provider's specialty    Patient had office visit in the last 12 months or has a visit in the next 30 days with authorizing provider or within the authorizing provider's specialty.  See \"Patient Info\" tab in inbasket, or \"Choose Columns\" in Meds & Orders section of the refill encounter.             Passed - Medication is active on med list       Passed - Patient is age 18 or older        Last Written Prescription Date:  1/22/19  Last Fill Quantity: 30,  # refills: 0   Last office visit: No previous visit found with prescribing provider:  2/27/19   Future Office Visit:      "

## 2019-03-11 ENCOUNTER — TELEPHONE (OUTPATIENT)
Dept: INTERNAL MEDICINE | Facility: CLINIC | Age: 59
End: 2019-03-11

## 2019-03-11 NOTE — TELEPHONE ENCOUNTER
Melissa, nurse from patient's living facility, calling with concerns.  She states that patient's right foot pain has worsened since he finished the Prednisone regimen.  Pain has now traveled to ankle and patient can barely walk.  Nurse denies any redness or swelling and notes that pain is internal and she wonders if patient needs another round of Prednisone, possibly a stronger dose?  Please advise.  Ok to leave detailed message on Melissa's VM.

## 2019-03-11 NOTE — TELEPHONE ENCOUNTER
Spoke with pt's home caregiver, Melissa at [phone] . She states pt was seen a week ago by Dr. Ibarra for this problem and was given the Prednisone then. (actually seen 2-27-19).   Melissa states symptoms returned after prednisone course completed. Does pt need to come in again so soon or re-try another prednisone course? Please advise and return call to Melissa.   Thanks. DEXTER Dumont, CMA

## 2019-03-11 NOTE — TELEPHONE ENCOUNTER
Dr. Ibarra had no availability for when Caregiver can bring him. Appointment made for tomorrow with Dr. Dove at 10:20 am. DEXTER Dumont CMA

## 2019-03-11 NOTE — TELEPHONE ENCOUNTER
Pt was seen at [facility] initially for this  Would rec he be seen to eval ankle/foot

## 2019-03-12 ENCOUNTER — OFFICE VISIT (OUTPATIENT)
Dept: INTERNAL MEDICINE | Facility: CLINIC | Age: 59
End: 2019-03-12
Payer: MEDICAID

## 2019-03-12 VITALS
TEMPERATURE: 97.4 F | DIASTOLIC BLOOD PRESSURE: 86 MMHG | HEART RATE: 92 BPM | WEIGHT: 218.7 LBS | RESPIRATION RATE: 14 BRPM | BODY MASS INDEX: 30.62 KG/M2 | OXYGEN SATURATION: 97 % | HEIGHT: 71 IN | SYSTOLIC BLOOD PRESSURE: 122 MMHG

## 2019-03-12 DIAGNOSIS — F20.9 SCHIZOPHRENIA, CHRONIC CONDITION WITH ACUTE EXACERBATION (H): ICD-10-CM

## 2019-03-12 DIAGNOSIS — M19.90 INFLAMMATORY ARTHRITIS: Primary | ICD-10-CM

## 2019-03-12 DIAGNOSIS — N18.30 CKD (CHRONIC KIDNEY DISEASE) STAGE 3, GFR 30-59 ML/MIN (H): ICD-10-CM

## 2019-03-12 PROCEDURE — 99214 OFFICE O/P EST MOD 30 MIN: CPT | Performed by: INTERNAL MEDICINE

## 2019-03-12 RX ORDER — PREDNISONE 10 MG/1
TABLET ORAL
Qty: 32 TABLET | Refills: 0 | Status: SHIPPED | OUTPATIENT
Start: 2019-03-12 | End: 2019-04-01

## 2019-03-12 ASSESSMENT — PAIN SCALES - GENERAL: PAINLEVEL: EXTREME PAIN (9)

## 2019-03-12 ASSESSMENT — MIFFLIN-ST. JEOR: SCORE: 1834.15

## 2019-03-12 NOTE — PROGRESS NOTES
"  SUBJECTIVE:   Hugo Zepeda is a 59 year old male who presents to clinic today for the following health issues:    This patient was scheduled to see me with his primary physician being Dr. Sommer.  Patient was apparently seen in the urgent care initially felt to have cellulitis and treated with oral antibiotics.  He was followed up in the clinic by Dr. Emmett love who felt that his clinical symptoms were consistent with that of acute gout.  He underwent a treatment of prednisone and had some lab work done which demonstrated an elevated inflammatory marker in her relatively stable uric acid level in the setting of a creatinine with slightly baseline and elevated.  The patient was given steroids and noticed that at 48 hours after onset of this medication his symptoms completely improved.  As the steroid was tapered off his symptoms worsened and now recur he is here today for follow-up  Sounds like in talking to the patient that his diet has been fairly rich nose foods that contain purines.    Gout- recheck from 2/27/19  Duration: 1 month  Description   Location: foot - right  Joint Swelling: YES  Redness: YES  Pain intensity:  severe  Accompanying signs and symptoms: Swelling/ redness returned Friday and is now going up foot and into ankle.   History  Previous history of gout: YES- Seen for inlfammatory monoarthritis \"likely gout\" 2/27- treated with  Prednisone taper. Patient states he had great improvement with prednisone but then sx returned as soon as medication was finished   Trauma to the area: no   Precipitating factors:   Alcohol usage: none  Diuretic use: no   Recent illness: no   Therapies tried and outcome: Cephalexin initially started by  on 2/17- minimal improvement. Prednisone- immediate improvement while taking, returned after finishing     Problem list and histories reviewed & adjusted, as indicated.  Additional history: as documented    Patient Active Problem List   Diagnosis     Schizophrenia, " chronic condition with acute exacerbation (H)     Hypertriglyceridemia     CKD (chronic kidney disease) stage 3, GFR 30-59 ml/min (H)     Family history of diabetes mellitus     ACP (advance care planning)     Non morbid obesity due to excess calories     Past Surgical History:   Procedure Laterality Date     ESOPHAGOSCOPY, GASTROSCOPY, DUODENOSCOPY (EGD), COMBINED N/A 11/10/2016    Procedure: COMBINED ESOPHAGOSCOPY, GASTROSCOPY, DUODENOSCOPY (EGD), BIOPSY SINGLE OR MULTIPLE;  Surgeon: Thomas Sequeira MD;  Location:  GI     NO HISTORY OF SURGERY         Social History     Tobacco Use     Smoking status: Former Smoker     Packs/day: 1.00     Years: 20.00     Pack years: 20.00     Types: Cigarettes     Last attempt to quit: 10/1/2011     Years since quittin.4     Smokeless tobacco: Never Used   Substance Use Topics     Alcohol use: No     Alcohol/week: 0.0 oz     Family History   Problem Relation Age of Onset     Diabetes Mother      Diabetes Father      C.A.D. No family hx of      Hypertension No family hx of      Coronary Artery Disease No family hx of      Hyperlipidemia No family hx of      Cerebrovascular Disease No family hx of      Breast Cancer No family hx of      Colon Cancer No family hx of      Prostate Cancer No family hx of      Other Cancer No family hx of      Depression No family hx of      Anxiety Disorder No family hx of      Mental Illness No family hx of      Substance Abuse No family hx of      Anesthesia Reaction No family hx of      Asthma No family hx of      Osteoporosis No family hx of      Genetic Disorder No family hx of      Thyroid Disease No family hx of      Obesity No family hx of          Current Outpatient Medications   Medication Sig Dispense Refill     COGENTIN 2 MG OR TABS 1 TABLET DAILY 30 0     omeprazole (PRILOSEC) 20 MG DR capsule TAKE ONE CAPSULE BY MOUTH DAILY. PATIENT NEEDS TO BE SEEN 30 capsule 11     RISPERDAL OR 3 mg daily       TRILAFON 8 MG OR TABS 52mg  "at hs       Allergies   Allergen Reactions     No Known Drug Allergies      BP Readings from Last 3 Encounters:   03/12/19 122/86   02/27/19 126/64   02/17/19 116/82    Wt Readings from Last 3 Encounters:   03/12/19 99.2 kg (218 lb 11.2 oz)   02/27/19 101.6 kg (224 lb)   02/17/19 101.2 kg (223 lb)           Reviewed and updated as needed this visit by clinical staff  Meds       Reviewed and updated as needed this visit by Provider         ROS:  CONSTITUTIONAL: NEGATIVE for fever, chills, change in weight  INTEGUMENTARY/SKIN: NEGATIVE for worrisome rashes, moles or lesions  ENT/MOUTH: NEGATIVE for ear, mouth and throat problems  RESP: NEGATIVE for significant cough or SOB  CV: NEGATIVE for chest pain, palpitations or peripheral edema  GI: NEGATIVE for nausea, abdominal pain, heartburn, or change in bowel habits  : NEGATIVE for frequency, dysuria, or hematuria  NEURO: NEGATIVE for weakness, dizziness or paresthesias  HEME: NEGATIVE for bleeding problems  PSYCHIATRIC: NEGATIVE for changes in mood or affect per baseline    OBJECTIVE:                                                    /86   Pulse 92   Temp 97.4  F (36.3  C) (Oral)   Resp 14   Ht 1.803 m (5' 11\")   Wt 99.2 kg (218 lb 11.2 oz)   SpO2 97%   BMI 30.50 kg/m    Body mass index is 30.5 kg/m .  GENERAL: alert   RESP: lungs clear to auscultation - no rales, no rhonchi, no wheezes  CV: regular rates and rhythm, normal S1 S2, no S3 or S4 and no click or rub   MS: There is inflammatory change and point tenderness noted to the ankle joints as well as the first metatarsal joint space.  The symptoms are similar to that when seen prior in the clinic although per patient there is more pain in the foot than in the toe at this point  NEURO: no focal changes  PSYCH: Alert and oriented times 3; speech- coherent      ASSESSMENT/PLAN:                                                      (M19.90) Inflammatory arthritis  (primary encounter diagnosis)  Comment: " Symptoms appear to be consistent with an inflammatory monoarthritis likely gout.  The patient had a very positive response to steroids thus I have suggested an extended course of steroids to cover 2 weeks.  I suggested 40 mg daily times 3 days, 30 mg daily times 3 days, 20 mg daily times 3 days and then 10 mg for 5 days to complete a 2-week course.  The patient was advised about aggressive fluids and hydration as well as limiting his diet to excess purines.  Patient was also advised to avoid anti-inflammatories due to his kidney disease as well as concomitant use of prednisone.  Plan: predniSONE (DELTASONE) 10 MG tablet        I have also recommended that the patient be seen in the clinic and followed up with a rheumatologist for further evaluation and determination of need of ongoing therapy    (F20.9) Schizophrenia, chronic condition with acute exacerbation (H)  Comment: Appears baseline without changes in medications recommended  Plan:     (N18.3) CKD (chronic kidney disease) stage 3, GFR 30-59 ml/min (H)  Comment: Encouraged aggressive hydration with last creatinine being stable from baseline  Plan: It appears per last clinic note that Dr. Sommer considering obtaining an ultrasound to rule out any obstructive process although unlikely    See Patient Instructions    Demetrio Barrera MD  [facility]    THE MEDICATION LIST HAS BEEN FULLY RECONCILED BY THE [facility] AND THE NURSING STAFF.

## 2019-03-27 ENCOUNTER — OFFICE VISIT (OUTPATIENT)
Dept: RHEUMATOLOGY | Facility: CLINIC | Age: 59
End: 2019-03-27
Payer: MEDICAID

## 2019-03-27 DIAGNOSIS — M1A.09X0 CHRONIC GOUT OF MULTIPLE SITES, UNSPECIFIED CAUSE: ICD-10-CM

## 2019-03-27 DIAGNOSIS — M25.461 EFFUSION OF RIGHT KNEE: Primary | ICD-10-CM

## 2019-03-27 LAB — DEPRECATED CALCIDIOL+CALCIFEROL SERPL-MC: 7 UG/L (ref 20–75)

## 2019-03-27 PROCEDURE — 86618 LYME DISEASE ANTIBODY: CPT | Performed by: INTERNAL MEDICINE

## 2019-03-27 PROCEDURE — 36415 COLL VENOUS BLD VENIPUNCTURE: CPT | Performed by: INTERNAL MEDICINE

## 2019-03-27 PROCEDURE — 86431 RHEUMATOID FACTOR QUANT: CPT | Performed by: INTERNAL MEDICINE

## 2019-03-27 PROCEDURE — 82306 VITAMIN D 25 HYDROXY: CPT | Performed by: INTERNAL MEDICINE

## 2019-03-27 PROCEDURE — 86038 ANTINUCLEAR ANTIBODIES: CPT | Performed by: INTERNAL MEDICINE

## 2019-03-27 PROCEDURE — 20610 DRAIN/INJ JOINT/BURSA W/O US: CPT | Mod: RT | Performed by: INTERNAL MEDICINE

## 2019-03-27 PROCEDURE — 99204 OFFICE O/P NEW MOD 45 MIN: CPT | Mod: 25 | Performed by: INTERNAL MEDICINE

## 2019-03-27 PROCEDURE — 86200 CCP ANTIBODY: CPT | Performed by: INTERNAL MEDICINE

## 2019-03-27 RX ORDER — PREDNISONE 10 MG/1
TABLET ORAL
Qty: 60 TABLET | Refills: 0 | Status: SHIPPED | OUTPATIENT
Start: 2019-03-27 | End: 2019-04-23

## 2019-03-27 RX ORDER — METHYLPREDNISOLONE ACETATE 40 MG/ML
80 INJECTION, SUSPENSION INTRA-ARTICULAR; INTRALESIONAL; INTRAMUSCULAR; SOFT TISSUE ONCE
Status: DISCONTINUED | OUTPATIENT
Start: 2019-03-27 | End: 2019-12-10

## 2019-03-27 ASSESSMENT — ROUTINE ASSESSMENT OF PATIENT INDEX DATA (RAPID3)
TOTAL RAPID3 SCORE: 22.5
RAPID3 INTERPRETATION: HIGH > 12.0

## 2019-03-27 NOTE — PROGRESS NOTES
[place] - Rheumatology Clinic Visit     Hugo Zepeda MRN# [id number]   YOB: 1960    Primary care provider: Arsh Sommer  Mar 27, 2019          Assessment and Plan:   # Right knee effusion - new   # Hyperuricemia > 6  # Chronic kidney disease  # H/o schizophrenia    TSh within normal limits    Xray:  1. Mild degenerative changes of the first metatarsophalangeal joint  with associated subchondral cyst in the head of first metatarsal.  2. Enthesopathic changes of the calcaneus.  3. No fracture or malalignment.  4. Question soft tissue injury versus dorsal angulation of the nail of  the dorsal distal great toe. Recommend clinical correlation. No gas  within soft tissues is seen. No cortical irregularity to suggest  osteomyelitis.    Suspected gout based on dietary habits, hyperuricemia, CKD, involved joints and response to prednisone.     PROCEDURE:  JOINT ASPIRATION/INJECTION.         After a discussion of risks, benefits and side effects of procedure, informed patient consent was obtained.       The right knee was prepped and draped in the usual clean fashion (sterile not required for this procedure).  3 cc of 1% lidocaine was used for local analgesia.    ASPIRATION: Using a 22 gauge needle and 10 cc syringe, apiration was attempted but unsuccessful as it was a dry tap.      INJECTION:  Using 2 cc of 1% lidocaine mixed with 80 mg of depomedrol, the right knee was successfully injected without complication.      Urate lowering therapy: awaiting results    Acute gout attack Rx recommendation: Corticosteroids are safer than NSAIDs (Systematic review of RCTs- J Rheum 2018 Simba et al). Start prednisone 10mg PO daily     Acute gout prophylaxis: Start prednisone 10mg PO daily     Dietary recommendations: Educated that red meat and soda can increase risk for gout attacks.   Vitamin C, cherry are good from gout standpoint.       The labs from patient records are reviewed.     I will be back in touch with  the patient through mychart/letter when results are available.     Patient agrees with the above mentioned treatment plan.     Most Recent Immunizations   Administered Date(s) Administered     Influenza (IIV3) PF 12/19/2006     Influenza Quad, Recombinant, p-free (RIV4) 02/27/2019     Influenza Vaccine IM 3yrs+ 4 Valent IIV4 10/06/2017     Influenza Vaccine, 3 YRS +, IM (QUADRIVALENT W/PRESERVATIVES) 09/06/2016     Pneumococcal 23 valent 06/25/2009     TD (ADULT, 7+) 02/27/2019     TDAP Vaccine (Adacel) 12/19/2006       Orders Placed This Encounter   Procedures     Vitamin D Deficiency     Rheumatoid factor     Cyclic Citrullinated Peptide Antibody IgG     Anti Nuclear Cindy IgG by IFA with Reflex     Lyme Disease Cindy with reflex to WB Serum       Return in about 6 weeks (around 5/8/2019).    There are no discontinued medications.  Current Outpatient Medications   Medication Sig Dispense Refill     COGENTIN 2 MG OR TABS 1 TABLET DAILY 30 0     omeprazole (PRILOSEC) 20 MG DR capsule TAKE ONE CAPSULE BY MOUTH DAILY. PATIENT NEEDS TO BE SEEN 30 capsule 11     predniSONE (DELTASONE) 10 MG tablet Take 10 mg PO daily. 60 tablet 0     RISPERDAL OR 3 mg daily       TRILAFON 8 MG OR TABS 52mg at hs       predniSONE (DELTASONE) 10 MG tablet Take 40mg po qday times times 3 days, then 30mg po qday times 3days, then 20mg po qday times 3 days then 10 mg po qday until gone.. (Patient not taking: Reported on 3/27/2019.) 32 tablet 0       William Doc Reyna MD  [facility]          Active Problem List:     Patient Active Problem List    Diagnosis Date Noted     Non morbid obesity due to excess calories 07/04/2016     Priority: Medium     ACP (advance care planning) 06/15/2016     Priority: Medium     Advance Care Planning 6/15/2016: ACP Review of Chart / Resources Provided:  Reviewed chart for advance care plan.  Jose Zepeda has no plan or code status on file. Discussed available resources and provided with  information.  Pt declined form at this time.  Added by Petty Sheikh             Hypertriglyceridemia 06/14/2016     Priority: Medium     CKD (chronic kidney disease) stage 3, GFR 30-59 ml/min (H) 06/14/2016     Priority: Medium     lithium       Family history of diabetes mellitus 06/14/2016     Priority: Medium     Schizophrenia, chronic condition with acute exacerbation (H) 04/19/2005     Priority: Medium     Problem list name updated by automated process. Provider to review              History of Present Illness:     Chief Complaint   Patient presents with     Establish Care       March 27, 2019  Have you ever seen a rheumatologist NO Who NA When NA  Joint pain history  Onset: Patient Patient is here to establish care for suspected gout. Started in right food placed on abx. After completed abx traveled to right ankle. Has been off abx for 2 days and is now affecting right knee  Involved joints: Right ankle, right foot, right knee  Pain scale:  9/10     Wakes the patient from sleep : Yes  Morning stiffness:Yes for 5 minutes  Meds used:tylenol     Interim history  Since last visit:  1. Infections - No  2. New symptoms/medical problem - Yes, pain is traveling up the leg in different joints  3. Any side effects from Rheum medications -NA  3. ER visits/Hospitalizations/surgeries - No  4. Last PCP visit: 2/27/19 Dr. Sommer    Gout history:  Crystal proven : no  Frequency of gout attacks: continuous in the last few weeks    Family history of gout: may be grandmother    Diet which can worsen gout:    Alcohol:  no   Seafood: no   Red meat: beef (cut back on that);    Pop/soda: yes    Labs:   Uric Acid   Date Value Ref Range Status   02/17/2019 6.6 3.5 - 7.2 mg/dL Final   ]  Creatinine   Date Value Ref Range Status   02/27/2019 1.75 (H) 0.66 - 1.25 mg/dL Final   ]  Liver Function Studies -   Recent Labs   Lab Test 06/14/16  1619 08/06/15  1707   ALBUMIN  --  4.0   ALT 35  --        Wt Readings from Last 4 Encounters:    19 102.1 kg (225 lb)   19 99.2 kg (218 lb 11.2 oz)   19 101.6 kg (224 lb)   19 101.2 kg (223 lb)     No h/o unintentional weight loss, fevers, rash, swollen glands  No family or personal history of psoriasis, ulcerative colitis or chron's disease. No h/o glaucoma.  Patient denies any raynauds  No h/o persistent shortness of breath, cough, chest pain  No h/o persistent vomiting, diarrhea, abdominal pain  No h/o hematochezia, hematuria, hemoptysis  No h/o seizures  No h/o cancer    BP Readings from Last 3 Encounters:   19 142/88   19 122/86   19 126/64              Review of Systems:   Complete ROS negative except for symptoms mentioned in the HPI          Past Medical History:     Past Medical History:   Diagnosis Date     CKD (chronic kidney disease) stage 3, GFR 30-59 ml/min (H) 2016     Unspecified schizophrenia, chronic condition with acute exacerbation     Micky-Tasks unlimited     Past Surgical History:   Procedure Laterality Date     ESOPHAGOSCOPY, GASTROSCOPY, DUODENOSCOPY (EGD), COMBINED N/A 11/10/2016    Procedure: COMBINED ESOPHAGOSCOPY, GASTROSCOPY, DUODENOSCOPY (EGD), BIOPSY SINGLE OR MULTIPLE;  Surgeon: Thomas Sequeira MD;  Location: [facility]     NO HISTORY OF SURGERY              Social History:     Social History     Occupational History     Occupation: Proviation   Tobacco Use     Smoking status: Former Smoker     Packs/day: 1.00     Years: 20.00     Pack years: 20.00     Types: Cigarettes     Last attempt to quit: 10/1/2011     Years since quittin.4     Smokeless tobacco: Never Used   Substance and Sexual Activity     Alcohol use: No     Alcohol/week: 0.0 oz     Drug use: No     Sexual activity: No            Family History:     Family History   Problem Relation Age of Onset     Diabetes Mother      Diabetes Father      C.A.D. No family hx of      Hypertension No family hx of      Coronary Artery Disease No family hx of      Hyperlipidemia No  family hx of      Cerebrovascular Disease No family hx of      Breast Cancer No family hx of      Colon Cancer No family hx of      Prostate Cancer No family hx of      Other Cancer No family hx of      Depression No family hx of      Anxiety Disorder No family hx of      Mental Illness No family hx of      Substance Abuse No family hx of      Anesthesia Reaction No family hx of      Asthma No family hx of      Osteoporosis No family hx of      Genetic Disorder No family hx of      Thyroid Disease No family hx of      Obesity No family hx of             Allergies:     Allergies   Allergen Reactions     No Known Drug Allergies             Medications:     Current Outpatient Medications   Medication Sig Dispense Refill     COGENTIN 2 MG OR TABS 1 TABLET DAILY 30 0     omeprazole (PRILOSEC) 20 MG DR capsule TAKE ONE CAPSULE BY MOUTH DAILY. PATIENT NEEDS TO BE SEEN 30 capsule 11     predniSONE (DELTASONE) 10 MG tablet Take 10 mg PO daily. 60 tablet 0     RISPERDAL OR 3 mg daily       TRILAFON 8 MG OR TABS 52mg at hs       predniSONE (DELTASONE) 10 MG tablet Take 40mg po qday times times 3 days, then 30mg po qday times 3days, then 20mg po qday times 3 days then 10 mg po qday until gone.. (Patient not taking: Reported on 3/27/2019.) 32 tablet 0            Physical Exam:   There were no vitals taken for this visit.  Wt Readings from Last 4 Encounters:   03/27/19 102.1 kg (225 lb)   03/12/19 99.2 kg (218 lb 11.2 oz)   02/27/19 101.6 kg (224 lb)   02/17/19 101.2 kg (223 lb)       Constitutional: well-developed, appearing stated age; cooperative  Eyes: normal conjunctiva, sclera  ENT: nl external ears, nose, lips.No mucous membrane lesions, normal saliva pool  Neck: no cervical lymphadenopathy  Resp: lungs clear to auscultation in the bases   CV: RRR, no added sounds  GI: Abdomen soft and no tenderness  : not tested  Lymph: no cervical, supraclavicular or epitrochlear nodes  MS: Right knee effusion with reduction in ROM.  Warmth and redness noted.   Right foot redness noted in MTPs more in 1st MTP with swelling. Tenderness present in MTPs.   All shoulder, elbow, wrist, MCP/PIP/DIP, hip, ankle were examined and  found without active synovitis or major deformity. Full ROM.  No dactylitis,  tenosynovitis, enthesopathy.  Skin: no rash in exposed areas  Psych: nl judgement, orientation, memory, affect.         Data:         William Reyna MD    [facility]

## 2019-03-27 NOTE — NURSING NOTE
"Chief Complaint   Patient presents with     Establish Care       Initial There were no vitals taken for this visit. Estimated body mass index is 31.38 kg/m  as calculated from the following:    Height as of 3/12/19: 1.803 m (5' 11\").    Weight as of an earlier encounter on 3/27/19: 102.1 kg (225 lb).  Medication Reconciliation: complete    Have you ever seen a rheumatologist NO Who NA When NA  Joint pain history  Onset: Patient Patient is here to establish care for suspected gout. Started in right food placed on abx. After completed abx traveled to right ankle. Has been off abx for 2 days and is now affecting right knee  Involved joints: Right ankle, right foot, right knee  Pain scale:  9/10     Wakes the patient from sleep : Yes  Morning stiffness:Yes for 5 minutes  Meds used:tylenol    Interim history  Since last visit:  1. Infections - No  2. New symptoms/medical problem - Yes, pain is traveling up the leg in different joints  3. Any side effects from Rheum medications -NA  3. ER visits/Hospitalizations/surgeries - No  4. Last PCP visit: 2/27/19 Dr. Sommer  Wt Readings from Last 4 Encounters:   03/27/19 102.1 kg (225 lb)   03/12/19 99.2 kg (218 lb 11.2 oz)   02/27/19 101.6 kg (224 lb)   02/17/19 101.2 kg (223 lb)     BP Readings from Last 3 Encounters:   03/27/19 142/88   03/12/19 122/86   02/27/19 126/64       "

## 2019-03-27 NOTE — LETTER
[facility]  [address] [address]                  [phone]   April 1, 2019    Hugo Zepeda  [address] [place] [address]      Dear Hugo,    Lyme test is negative.     Rheumatoid antibodies are negative.     DANYEL antibody is negative.     Your vitamin D level is low. Low Vitamin D levels are associated with some autoimmune conditions. I have sent prescription for Vitamin D supplement to your preferred pharmacy.Please note that it is weekly medication. After you complete this course of prescription, please take Vitamin D (over-the-counter) 2000 IU daily and discuss with your primary care doctor.     Please contact me if you have any questions.           Thank you very much for choosing [facility]    Best regards,    Dr. Reyna/puma        Results for orders placed or performed in visit on 03/27/19   Vitamin D Deficiency   Result Value Ref Range    Vitamin D Deficiency screening 7 (L) 20 - 75 ug/L   Rheumatoid factor   Result Value Ref Range    Rheumatoid Factor <20 <20 IU/mL   Cyclic Citrullinated Peptide Antibody IgG   Result Value Ref Range    Cyclic Citrullinated Peptide Antibody, IgG 2 <7 U/mL   Anti Nuclear Cindy IgG by IFA with Reflex   Result Value Ref Range    DANYEL interpretation Negative NEG^Negative   Lyme Disease Cindy with reflex to WB Serum   Result Value Ref Range    Lyme Disease Antibodies Serum 0.02 0.00 - 0.89

## 2019-03-27 NOTE — PATIENT INSTRUCTIONS
Patient Education     Cortisone Injections    Cortisone is a type of steroid. It can greatly reduce swelling, redness, inflammation, and pain. Being injected with cortisone is simple and doesn t take long. Your doctor may ask you questions about your health. Cortisone can affect certain health conditions, such as diabetes.  Why have a cortisone injection?  Injecting cortisone can sometimes relieve pain for anything from a sports injury to arthritis. Your doctor may suggest an injection if rest, splints, physical therapy, rehabilitation exercises, or oral medicine doesn t relieve your pain. Injecting cortisone is simpler than having surgery. And cortisone may provide the lasting pain relief that can help you get out and enjoy life again. A recent study showed steroid injections may actually worsen osteoarthritis of the knee. Be sure to discuss all options with your healthcare provider. Injections are usually used no more than 3 to 4 times a year in one area of the body.  Getting the injection  Your doctor will start by cleaning and possibly numbing your skin at the injection site. Next you ll be injected with local anesthetics (for short-term pain relief) and cortisone. The injection may last a few moments. A small bandage will be put over the injection site. You ll then be ready to go home.  After your injection  After being injected, make sure you don t injure the treated region. But stay active. Enjoy a walk or some other mild activity. Just be careful not to strain the region that gave you trouble.  The next day  Some people feel more pain after being injected. This is normal, and it will go away soon. Applying ice for 20 minutes at a time to your injury may reduce the increased pain. Rest for the first day or two. You don t need to stay in bed. But avoid tasks that may strain the injured region.  If you have diabetes  Cortisone injections can increase your blood sugar for several days after the injection. If  you have diabetes, follow your blood sugar closely during this time. Follow your regular plan for what to do when your blood sugar is elevated.   Date Last Reviewed: 9/1/2017 2000-2018 [employer]. [address] [place] [address], [place] [address]. All rights reserved. This information is not intended as a substitute for professional medical care. Always follow your healthcare professional's instructions.

## 2019-03-28 LAB
ANA SER QL IF: NEGATIVE
B BURGDOR IGG+IGM SER QL: 0.02 (ref 0–0.89)
CCP AB SER IA-ACNC: 2 U/ML
RHEUMATOID FACT SER NEPH-ACNC: <20 IU/ML (ref 0–20)

## 2019-04-01 ENCOUNTER — OFFICE VISIT (OUTPATIENT)
Dept: INTERNAL MEDICINE | Facility: CLINIC | Age: 59
End: 2019-04-01
Payer: MEDICAID

## 2019-04-01 VITALS
HEART RATE: 82 BPM | RESPIRATION RATE: 16 BRPM | SYSTOLIC BLOOD PRESSURE: 130 MMHG | TEMPERATURE: 97.5 F | DIASTOLIC BLOOD PRESSURE: 82 MMHG | BODY MASS INDEX: 31.56 KG/M2 | OXYGEN SATURATION: 97 % | WEIGHT: 226.3 LBS

## 2019-04-01 DIAGNOSIS — E55.9 VITAMIN D DEFICIENCY: Primary | ICD-10-CM

## 2019-04-01 DIAGNOSIS — M13.10 MONOARTICULAR ARTHRITIS: Primary | ICD-10-CM

## 2019-04-01 DIAGNOSIS — Z11.4 SCREENING FOR HIV (HUMAN IMMUNODEFICIENCY VIRUS): ICD-10-CM

## 2019-04-01 DIAGNOSIS — Z11.59 NEED FOR HEPATITIS C SCREENING TEST: ICD-10-CM

## 2019-04-01 PROCEDURE — 99213 OFFICE O/P EST LOW 20 MIN: CPT | Performed by: INTERNAL MEDICINE

## 2019-04-01 RX ORDER — ERGOCALCIFEROL 1.25 MG/1
50000 CAPSULE, LIQUID FILLED ORAL
Qty: 6 CAPSULE | Refills: 0 | Status: SHIPPED | OUTPATIENT
Start: 2019-04-01 | End: 2019-12-10

## 2019-04-01 NOTE — RESULT ENCOUNTER NOTE
Please send a letter to the patient with a copy of the lab results and also the following note:  Lyme neg.   Rheumatoid antibodies are negative.   DANYEL antibody is negative.   Your vitamin D level is low. Low Vitamin D levels are associated with some autoimmune conditions. I have sent prescription for Vitamin D supplement to your preferred pharmacy.Please note that it is weekly medication. After you complete this course of prescription, please take Vitamin D (over-the-counter) 2000 IU daily and discuss with your primary care doctor.

## 2019-04-01 NOTE — PROGRESS NOTES
SUBJECTIVE:   Hugo Zepeda is a 59 year old male who presents to clinic today for the following health issues:    Patient is here today for follow-up of his recent visits for migratory monoarthritis which appears inflammatory in nature.  Since his intra-articular steroid injection of his knee he has felt quite well.  States all of his symptoms have resolved.  Presently on 10 mg of prednisone orally.  Has rheumatology follow-up later this month.    Problem list and histories reviewed & adjusted, as indicated.  Additional history: as documented    Labs reviewed in EPIC    Reviewed and updated as needed this visit by clinical staff  Problems       Reviewed and updated as needed this visit by Provider         ROS:  Constitutional, HEENT, cardiovascular, pulmonary, gi and gu systems are negative, except as otherwise noted.    OBJECTIVE:                                                    /82   Pulse 82   Temp 97.5  F (36.4  C) (Oral)   Resp 16   Wt 102.6 kg (226 lb 4.8 oz)   SpO2 97%   BMI 31.56 kg/m    Body mass index is 31.56 kg/m .  GENERAL APPEARANCE: alert, no distress and over weight  RESP: lungs clear to auscultation - no rales, rhonchi or wheezes  CV: regular rates and rhythm, normal S1 S2, no S3 or S4 and no murmur, click or rub  MS: No sign of synovitis in the lower extremities including knees ankles or feet bilaterally.  Wrists and hands also unremarkable for active synovitis    Diagnostic test results:  none      ASSESSMENT/PLAN:                                                    1. Monoarticular arthritis  Gout appears to be highest on the differential.  His lab work has been unremarkable for any other sign of other inflammatory arthritis.  Has rheumatology follow-up later this month.  Continue prednisone until that time.      Arsh Sommer MD  [facility]

## 2019-04-19 NOTE — PROGRESS NOTES
[facility] Visit     Hugo Zepeda MRN# [id number]   YOB: 1960    Primary care provider: Arsh Sommer  Apr 23, 2019          Assessment and Plan:   # Right knee effusion - resolved  # Hyperuricemia > 6  # Chronic kidney disease  # H/o schizophrenia    TSh within normal limits    Xray:  1. Mild degenerative changes of the first metatarsophalangeal joint  with associated subchondral cyst in the head of first metatarsal.  2. Enthesopathic changes of the calcaneus.  3. No fracture or malalignment.  4. Question soft tissue injury versus dorsal angulation of the nail of  the dorsal distal great toe. Recommend clinical correlation. No gas  within soft tissues is seen. No cortical irregularity to suggest  Osteomyelitis.    Lyme neg.   Rheumatoid antibodies are negative.   DANYEL antibody is negative.   Your vitamin D level is low. Low Vitamin D levels are associated with some autoimmune conditions. I have sent prescription for Vitamin D supplement to your preferred pharmacy.Please note that it is weekly medication. After you complete this course of prescription, please take Vitamin D (over-the-counter) 2000 IU daily and discuss with your primary care doctor.     Suspected gout based on dietary habits, hyperuricemia, CKD, involved joints and response to prednisone.     Right knee local cortisone injection has helped significantly.     Urate lowering therapy: start allopurinol. Side effect risk discussed.     Acute gout attack Rx recommendation: Corticosteroids are safer than NSAIDs (Systematic review of RCTs- J Rheum 2018 Simba et al).     Acute gout prophylaxis: Continue prednisone 10mg PO daily X 4 weeks and then 5mg PO daily X 4 weeks and then stop    Dietary recommendations: had Educated that red meat and soda can increase risk for gout attacks.   Vitamin C, cherry are good from gout standpoint.     BP Readings from Last 3 Encounters:   04/23/19 150/86   04/01/19 130/82   03/27/19  142/88   Jose to follow up with Primary Care provider regarding elevated blood pressure.      The labs from patient records are reviewed.     I will be back in touch with the patient through mychart/letter when results are available.     Patient agrees with the above mentioned treatment plan.     Most Recent Immunizations   Administered Date(s) Administered     Influenza (IIV3) PF 12/19/2006     Influenza Quad, Recombinant, p-free (RIV4) 02/27/2019     Influenza Vaccine IM 3yrs+ 4 Valent IIV4 10/06/2017     Influenza Vaccine, 3 YRS +, IM (QUADRIVALENT W/PRESERVATIVES) 09/06/2016     Pneumococcal 23 valent 06/25/2009     TD (ADULT, 7+) 02/27/2019     TDAP Vaccine (Adacel) 12/19/2006       No orders of the defined types were placed in this encounter.      Return in about 6 weeks (around 6/4/2019).    Medications Discontinued During This Encounter   Medication Reason     predniSONE (DELTASONE) 10 MG tablet      Current Outpatient Medications   Medication Sig Dispense Refill     allopurinol (ZYLOPRIM) 100 MG tablet Start 100mg daily for 2 weeks, then 200mg daily X 2 weeks and then 300mg PO daily 80 tablet 0     COGENTIN 2 MG OR TABS 1 TABLET DAILY 30 0     omeprazole (PRILOSEC) 20 MG DR capsule TAKE ONE CAPSULE BY MOUTH DAILY. PATIENT NEEDS TO BE SEEN 30 capsule 11     predniSONE (DELTASONE) 10 MG tablet April 23, 2019: take 10 mg PO daily X 4 weeks; then 5 mg PO daily X 4 weeks and then stop. 90 tablet 0     RISPERDAL OR 3 mg daily       TRILAFON 8 MG OR TABS 52mg at hs       vitamin D2 (ERGOCALCIFEROL) 79463 units (1250 mcg) capsule Take 1 capsule (50,000 Units) by mouth every 7 days 6 capsule 0       William Reyna MD  [facility]          Active Problem List:     Patient Active Problem List    Diagnosis Date Noted     Non morbid obesity due to excess calories 07/04/2016     Priority: Medium     ACP (advance care planning) 06/15/2016     Priority: Medium     Advance Care Planning 6/15/2016: ACP  Review of Chart / Resources Provided:  Reviewed chart for advance care plan.  Jose Zepeda has no plan or code status on file. Discussed available resources and provided with information.  Pt declined form at this time.  Added by Petty Sheikh             Hypertriglyceridemia 06/14/2016     Priority: Medium     CKD (chronic kidney disease) stage 3, GFR 30-59 ml/min (H) 06/14/2016     Priority: Medium     lithium       Family history of diabetes mellitus 06/14/2016     Priority: Medium     Schizophrenia, chronic condition with acute exacerbation (H) 04/19/2005     Priority: Medium     Problem list name updated by automated process. Provider to review              History of Present Illness:     Chief Complaint   Patient presents with     RECHECK       March 27, 2019  Have you ever seen a rheumatologist NO Who NA When NA  Joint pain history  Onset: Patient Patient is here to establish care for suspected gout. Started in right food placed on abx. After completed abx traveled to right ankle. Has been off abx for 2 days and is now affecting right knee  Involved joints: Right ankle, right foot, right knee  Pain scale:  9/10     Wakes the patient from sleep : Yes  Morning stiffness:Yes for 5 minutes  Meds used:tylenol     Interim history  Since last visit:  1. Infections - No  2. New symptoms/medical problem - Yes, pain is traveling up the leg in different joints  3. Any side effects from Rheum medications -NA  3. ER visits/Hospitalizations/surgeries - No  4. Last PCP visit: 2/27/19 Dr. Sommer    Gout history:  Crystal proven : no  Frequency of gout attacks: continuous in the last few weeks    Family history of gout: may be grandmother    Diet which can worsen gout:    Alcohol:  no   Seafood: no   Red meat: beef (cut back on that);    Pop/soda: yes    Labs:   Uric Acid   Date Value Ref Range Status   02/17/2019 6.6 3.5 - 7.2 mg/dL Final   ]  Creatinine   Date Value Ref Range Status   02/27/2019 1.75 (H) 0.66 - 1.25 mg/dL  Final   ]  Liver Function Studies -   Recent Labs   Lab Test 06/14/16  1619 08/06/15  1707   ALBUMIN  --  4.0   ALT 35  --        Wt Readings from Last 4 Encounters:   04/23/19 104.3 kg (230 lb)   04/01/19 102.6 kg (226 lb 4.8 oz)   03/27/19 102.1 kg (225 lb)   03/12/19 99.2 kg (218 lb 11.2 oz)     No h/o unintentional weight loss, fevers, rash, swollen glands  No family or personal history of psoriasis, ulcerative colitis or chron's disease. No h/o glaucoma.  Patient denies any raynauds  No h/o persistent shortness of breath, cough, chest pain  No h/o persistent vomiting, diarrhea, abdominal pain  No h/o hematochezia, hematuria, hemoptysis  No h/o seizures  No h/o cancer    April 23, 2019  Have you ever seen a rheumatologist yes Who you When 3/27/19  Joint pain history  Onset: Patient is here for a follow up. Patient reports has not had any knee pain since LOV. Patient reports has been getting judie horses in both legs  Involved joints: NA  Pain scale:  3.5/10     Wakes the patient from sleep : No  Morning stiffness:No  Meds used:prednisone     Interim history  Since last visit:  1. Infections - No  2. New symptoms/medical problem - No  3. Any side effects from Rheum medications -none  3. ER visits/Hospitalizations/surgeries - No  4. Last PCP visit: 4/1/19    BP Readings from Last 3 Encounters:   04/23/19 150/86   04/01/19 130/82   03/27/19 142/88              Review of Systems:   Complete ROS negative except for symptoms mentioned in the HPI          Past Medical History:     Past Medical History:   Diagnosis Date     CKD (chronic kidney disease) stage 3, GFR 30-59 ml/min (H) 6/14/2016     Unspecified schizophrenia, chronic condition with acute exacerbation     Micky-Tasks unlimited     Past Surgical History:   Procedure Laterality Date     ESOPHAGOSCOPY, GASTROSCOPY, DUODENOSCOPY (EGD), COMBINED N/A 11/10/2016    Procedure: COMBINED ESOPHAGOSCOPY, GASTROSCOPY, DUODENOSCOPY (EGD), BIOPSY SINGLE OR MULTIPLE;   Surgeon: Thomas Sequeira MD;  Location:  GI     NO HISTORY OF SURGERY              Social History:     Social History     Occupational History     Occupation:    Tobacco Use     Smoking status: Former Smoker     Packs/day: 1.00     Years: 20.00     Pack years: 20.00     Types: Cigarettes     Last attempt to quit: 10/1/2011     Years since quittin.5     Smokeless tobacco: Never Used   Substance and Sexual Activity     Alcohol use: No     Alcohol/week: 0.0 oz     Drug use: No     Sexual activity: Never            Family History:     Family History   Problem Relation Age of Onset     Diabetes Mother      Diabetes Father      C.A.D. No family hx of      Hypertension No family hx of      Coronary Artery Disease No family hx of      Hyperlipidemia No family hx of      Cerebrovascular Disease No family hx of      Breast Cancer No family hx of      Colon Cancer No family hx of      Prostate Cancer No family hx of      Other Cancer No family hx of      Depression No family hx of      Anxiety Disorder No family hx of      Mental Illness No family hx of      Substance Abuse No family hx of      Anesthesia Reaction No family hx of      Asthma No family hx of      Osteoporosis No family hx of      Genetic Disorder No family hx of      Thyroid Disease No family hx of      Obesity No family hx of             Allergies:     Allergies   Allergen Reactions     No Known Drug Allergies             Medications:     Current Outpatient Medications   Medication Sig Dispense Refill     allopurinol (ZYLOPRIM) 100 MG tablet Start 100mg daily for 2 weeks, then 200mg daily X 2 weeks and then 300mg PO daily 80 tablet 0     COGENTIN 2 MG OR TABS 1 TABLET DAILY 30 0     omeprazole (PRILOSEC) 20 MG DR capsule TAKE ONE CAPSULE BY MOUTH DAILY. PATIENT NEEDS TO BE SEEN 30 capsule 11     predniSONE (DELTASONE) 10 MG tablet 2019: take 10 mg PO daily X 4 weeks; then 5 mg PO daily X 4 weeks and then stop. 90 tablet 0      "RISPERDAL OR 3 mg daily       TRILAFON 8 MG OR TABS 52mg at hs       vitamin D2 (ERGOCALCIFEROL) 77477 units (1250 mcg) capsule Take 1 capsule (50,000 Units) by mouth every 7 days 6 capsule 0            Physical Exam:   Blood pressure 150/86, pulse 69, temperature 97.5  F (36.4  C), temperature source Oral, height 1.803 m (5' 11\"), weight 104.3 kg (230 lb), SpO2 98 %.  Wt Readings from Last 4 Encounters:   04/23/19 104.3 kg (230 lb)   04/01/19 102.6 kg (226 lb 4.8 oz)   03/27/19 102.1 kg (225 lb)   03/12/19 99.2 kg (218 lb 11.2 oz)       Constitutional: well-developed, appearing stated age; cooperative  MS: Right knee effusion with reduction in ROM RESOLVED. Warmth and redness RESOLVED.   Right foot redness noted in MTPs more in 1st MTP with swelling RESOLVED. Tenderness present in MTPs- RESOLVED.   Skin: no rash in exposed areas  Psych: nl judgement, orientation, memory, affect.         Data:         William Reyna MD    [facility]    "

## 2019-04-23 ENCOUNTER — OFFICE VISIT (OUTPATIENT)
Dept: RHEUMATOLOGY | Facility: CLINIC | Age: 59
End: 2019-04-23
Payer: MEDICAID

## 2019-04-23 VITALS
TEMPERATURE: 97.5 F | HEIGHT: 71 IN | BODY MASS INDEX: 32.2 KG/M2 | OXYGEN SATURATION: 98 % | SYSTOLIC BLOOD PRESSURE: 150 MMHG | HEART RATE: 69 BPM | DIASTOLIC BLOOD PRESSURE: 86 MMHG | WEIGHT: 230 LBS

## 2019-04-23 DIAGNOSIS — M1A.09X0 CHRONIC GOUT OF MULTIPLE SITES, UNSPECIFIED CAUSE: Primary | ICD-10-CM

## 2019-04-23 PROCEDURE — 99213 OFFICE O/P EST LOW 20 MIN: CPT | Performed by: INTERNAL MEDICINE

## 2019-04-23 RX ORDER — ALLOPURINOL 100 MG/1
TABLET ORAL
Qty: 80 TABLET | Refills: 0 | Status: SHIPPED | OUTPATIENT
Start: 2019-04-23 | End: 2019-05-16

## 2019-04-23 RX ORDER — PREDNISONE 10 MG/1
TABLET ORAL
Qty: 90 TABLET | Refills: 0 | Status: SHIPPED | OUTPATIENT
Start: 2019-04-23 | End: 2019-06-04

## 2019-04-23 ASSESSMENT — ROUTINE ASSESSMENT OF PATIENT INDEX DATA (RAPID3)
TOTAL RAPID3 SCORE: 4.5
RAPID3 INTERPRETATION: LOW 3.1-6

## 2019-04-23 ASSESSMENT — MIFFLIN-ST. JEOR: SCORE: 1885.4

## 2019-04-23 NOTE — NURSING NOTE
"Chief Complaint   Patient presents with     RECHECK       Initial /86   Pulse 69   Temp 97.5  F (36.4  C) (Oral)   Ht 1.803 m (5' 11\")   Wt 104.3 kg (230 lb)   SpO2 98%   BMI 32.08 kg/m   Estimated body mass index is 32.08 kg/m  as calculated from the following:    Height as of this encounter: 1.803 m (5' 11\").    Weight as of this encounter: 104.3 kg (230 lb).  Medication Reconciliation: complete    Have you ever seen a rheumatologist yes Who you When 3/27/19  Joint pain history  Onset: Patient is here for a follow up. Patient reports has not had any knee pain since LOV. Patient reports has been getting judie horses in both legs  Involved joints: NA  Pain scale:  3.5/10     Wakes the patient from sleep : No  Morning stiffness:No  Meds used:prednisone    Interim history  Since last visit:  1. Infections - No  2. New symptoms/medical problem - No  3. Any side effects from Rheum medications -none  3. ER visits/Hospitalizations/surgeries - No  4. Last PCP visit: 4/1/19  Wt Readings from Last 4 Encounters:   04/23/19 104.3 kg (230 lb)   04/01/19 102.6 kg (226 lb 4.8 oz)   03/27/19 102.1 kg (225 lb)   03/12/19 99.2 kg (218 lb 11.2 oz)     BP Readings from Last 3 Encounters:   04/23/19 150/86   04/01/19 130/82   03/27/19 142/88       "

## 2019-04-26 ENCOUNTER — OFFICE VISIT (OUTPATIENT)
Dept: INTERNAL MEDICINE | Facility: CLINIC | Age: 59
End: 2019-04-26
Payer: MEDICAID

## 2019-04-26 VITALS
RESPIRATION RATE: 18 BRPM | WEIGHT: 228 LBS | BODY MASS INDEX: 31.8 KG/M2 | HEART RATE: 98 BPM | SYSTOLIC BLOOD PRESSURE: 124 MMHG | DIASTOLIC BLOOD PRESSURE: 80 MMHG | TEMPERATURE: 97.9 F | OXYGEN SATURATION: 98 %

## 2019-04-26 DIAGNOSIS — L03.114 CELLULITIS OF ARM, LEFT: ICD-10-CM
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
## 2019-04-26 DIAGNOSIS — Z79.52 ON PREDNISONE THERAPY: ICD-10-CM

## 2019-04-26 PROCEDURE — 99214 OFFICE O/P EST MOD 30 MIN: CPT | Performed by: PHYSICIAN ASSISTANT

## 2019-04-26 NOTE — PROGRESS NOTES
SUBJECTIVE:   Hugo Zepeda is a 59 year old male who presents to clinic today for the following   health issues:      Concern - Bite wound  Onset: 3 days    Description:   Bit by pet cat on left forearm    Intensity: severe    Progression of Symptoms:  worsening    Accompanying Signs & Symptoms:  Swelling, redness, warm to touch, painful on movement.     Previous history of similar problem:   None    Precipitating factors:   Worsened by: Time    Alleviating factors:  Improved by: None    Therapies Tried and outcome: None    -------------------------------------    Additional history: as documented    Reviewed  and updated as needed this visit by clinical staff         Reviewed and updated as needed this visit by Provider  Allergies  Meds         Labs reviewed in EPIC    ROS:  Constitutional, HEENT, cardiovascular, pulmonary, gi and gu systems are negative, except as otherwise noted.    OBJECTIVE:     /80 (BP Location: Left arm, Patient Position: Chair, Cuff Size: Adult Large)   Pulse 98   Temp 97.9  F (36.6  C) (Oral)   Resp 18   Wt 103.4 kg (228 lb)   SpO2 98%   BMI 31.80 kg/m    Body mass index is 31.8 kg/m .  GENERAL: healthy, alert and no distress  NECK: no adenopathy, no asymmetry, masses, or scars and thyroid normal to palpation  RESP: lungs clear to auscultation - no rales, rhonchi or wheezes  CV: regular rates and rhythm and normal S1 S2, no S3 or S4  MS: no gross musculoskeletal defects noted,   SKIN: left forearm,  Two areas - puncture type wound on the forearm.  Swelling and redness noted around the about 5 cm     Diagnostic Test Results:  none     ASSESSMENT/PLAN:             1. Cat bite, initial encounter    - amoxicillin-clavulanate (AUGMENTIN) 875-125 MG tablet; Take 1 tablet by mouth 2 times daily for 10 days  Dispense: 20 tablet; Refill: 0    2. Cellulitis of arm, left      3. On prednisone therapy  Due to gout       Warm to cool compresses  Antibiotic as directed  Recheck early  next week if not improving  Get both doses of augmentin in today         Berna Chamberlain PA-C  [facility]

## 2019-05-10 NOTE — PROGRESS NOTES
[place] - Rheumatology Clinic Visit     Hugo Zepeda MRN# [id number]   YOB: 1960    Primary care provider: Arsh Sommer  Jun 4, 2019          Assessment and Plan:   # CHRONIC GOUT  # Right knee effusion - resolved  # Hyperuricemia > 6  # Chronic kidney disease  # H/o schizophrenia    TSh within normal limits    Xray:  1. Mild degenerative changes of the first metatarsophalangeal joint  with associated subchondral cyst in the head of first metatarsal.  2. Enthesopathic changes of the calcaneus.  3. No fracture or malalignment.  4. Question soft tissue injury versus dorsal angulation of the nail of  the dorsal distal great toe. Recommend clinical correlation. No gas  within soft tissues is seen. No cortical irregularity to suggest  Osteomyelitis.    Lyme neg.   Rheumatoid antibodies are negative.   DANYEL antibody is negative.   Your vitamin D level is low. Low Vitamin D levels are associated with some autoimmune conditions. I have sent prescription for Vitamin D supplement to your preferred pharmacy.Please note that it is weekly medication. After you complete this course of prescription, please take Vitamin D (over-the-counter) 2000 IU daily and discuss with your primary care doctor.     Suspected gout based on dietary habits, hyperuricemia, CKD, involved joints and response to prednisone.     Right knee local cortisone injection has helped significantly.     Urate lowering therapy: started allopurinol 4/19.  Labs today. If uric acid < 6, continue with allopurinol 300 mg PO daily.     Acute gout attack Rx recommendation: Corticosteroids are safer than NSAIDs (Systematic review of RCTs- J Rheum 2018 Simba et al).     Acute gout prophylaxis: STOP prednisone.     Dietary recommendations: had Educated that red meat and soda can increase risk for gout attacks.     F/u with PCP in 6 weeks.     The labs from patient records are reviewed.     I will be back in touch with the patient through mychart/letter  when results are available.     Patient agrees with the above mentioned treatment plan.     Most Recent Immunizations   Administered Date(s) Administered     Influenza (IIV3) PF 12/19/2006     Influenza Quad, Recombinant, p-free (RIV4) 02/27/2019     Influenza Vaccine IM 3yrs+ 4 Valent IIV4 10/06/2017     Influenza Vaccine, 3 YRS +, IM (QUADRIVALENT W/PRESERVATIVES) 09/06/2016     Pneumococcal 23 valent 06/25/2009     TD (ADULT, 7+) 02/27/2019     TDAP Vaccine (Adacel) 12/19/2006       Orders Placed This Encounter   Procedures     CBC with platelets differential     AST     ALT     Creatinine     Uric acid       No follow-ups on file.    Medications Discontinued During This Encounter   Medication Reason     predniSONE (DELTASONE) 10 MG tablet      predniSONE (DELTASONE) 5 MG tablet      Current Outpatient Medications   Medication Sig Dispense Refill     allopurinol (ZYLOPRIM) 300 MG tablet 300mg PO daily 90 tablet 0     COGENTIN 2 MG OR TABS 1 TABLET DAILY 30 0     omeprazole (PRILOSEC) 20 MG DR capsule TAKE ONE CAPSULE BY MOUTH DAILY. PATIENT NEEDS TO BE SEEN 30 capsule 11     RISPERDAL OR 3 mg daily       TRILAFON 8 MG OR TABS 52mg at hs       vitamin D2 (ERGOCALCIFEROL) 41157 units (1250 mcg) capsule Take 1 capsule (50,000 Units) by mouth every 7 days 6 capsule 0       William Reyna MD  [facility]          Active Problem List:     Patient Active Problem List    Diagnosis Date Noted     Non morbid obesity due to excess calories 07/04/2016     Priority: Medium     ACP (advance care planning) 06/15/2016     Priority: Medium     Advance Care Planning 6/15/2016: ACP Review of Chart / Resources Provided:  Reviewed chart for advance care plan.  Jose Zepeda has no plan or code status on file. Discussed available resources and provided with information.  Pt declined form at this time.  Added by Petty Sheikh             Hypertriglyceridemia 06/14/2016     Priority: Medium     CKD (chronic kidney  disease) stage 3, GFR 30-59 ml/min (H) 06/14/2016     Priority: Medium     lithium       Family history of diabetes mellitus 06/14/2016     Priority: Medium     Schizophrenia, chronic condition with acute exacerbation (H) 04/19/2005     Priority: Medium     Problem list name updated by automated process. Provider to review              History of Present Illness:     Chief Complaint   Patient presents with     RECHECK       March 27, 2019  Have you ever seen a rheumatologist NO Who NA When NA  Joint pain history  Onset: Patient Patient is here to establish care for suspected gout. Started in right food placed on abx. After completed abx traveled to right ankle. Has been off abx for 2 days and is now affecting right knee  Involved joints: Right ankle, right foot, right knee  Pain scale:  9/10     Wakes the patient from sleep : Yes  Morning stiffness:Yes for 5 minutes  Meds used:tylenol     Interim history  Since last visit:  1. Infections - No  2. New symptoms/medical problem - Yes, pain is traveling up the leg in different joints  3. Any side effects from Rheum medications -NA  3. ER visits/Hospitalizations/surgeries - No  4. Last PCP visit: 2/27/19 Dr. Sommer    Gout history:  Crystal proven : no  Frequency of gout attacks: continuous in the last few weeks    Family history of gout: may be grandmother    Diet which can worsen gout:    Alcohol:  no   Seafood: no   Red meat: beef (cut back on that);    Pop/soda: yes    Labs:   Uric Acid   Date Value Ref Range Status   02/17/2019 6.6 3.5 - 7.2 mg/dL Final   ]  Creatinine   Date Value Ref Range Status   02/27/2019 1.75 (H) 0.66 - 1.25 mg/dL Final   ]  Liver Function Studies -   Recent Labs   Lab Test 06/14/16  1619 08/06/15  1707   ALBUMIN  --  4.0   ALT 35  --        Wt Readings from Last 4 Encounters:   06/04/19 107 kg (236 lb)   04/26/19 103.4 kg (228 lb)   04/23/19 104.3 kg (230 lb)   04/01/19 102.6 kg (226 lb 4.8 oz)     No h/o unintentional weight loss, fevers,  rash, swollen glands  No family or personal history of psoriasis, ulcerative colitis or chron's disease. No h/o glaucoma.  Patient denies any raynauds  No h/o persistent shortness of breath, cough, chest pain  No h/o persistent vomiting, diarrhea, abdominal pain  No h/o hematochezia, hematuria, hemoptysis  No h/o seizures  No h/o cancer    April 23, 2019  Have you ever seen a rheumatologist yes Who you When 3/27/19  Joint pain history  Onset: Patient is here for a follow up. Patient reports has not had any knee pain since LOV. Patient reports has been getting judie horses in both legs  Involved joints: NA  Pain scale:  3.5/10     Wakes the patient from sleep : No  Morning stiffness:No  Meds used:prednisone     Interim history  Since last visit:  1. Infections - No  2. New symptoms/medical problem - No  3. Any side effects from Rheum medications -none  3. ER visits/Hospitalizations/surgeries - No  4. Last PCP visit: 4/1/19 June 4, 2019  Have you ever seen a rheumatologist Yes  Who you When 4/23/19  Joint pain history  Onset:  Return visit. Pain has been relieved.   Involved joints: NA  Pain scale:  0/10     Wakes the patient from sleep : No  Morning stiffness:No  Meds used: Prednisone, methylprednisolone- has been unsure of instructions on when to reduce prednisone.      Interim history  Since last visit:  1. Infections - Yes- cat bite on finger  2. New symptoms/medical problem - No  3. Any side effects from Rheum medications - weight gain  3. ER visits/Hospitalizations/surgeries - No  4. Last PCP visit: 4/1/19    BP Readings from Last 3 Encounters:   06/04/19 146/90   04/26/19 124/80   04/23/19 150/86              Review of Systems:   Complete ROS negative except for symptoms mentioned in the HPI          Past Medical History:     Past Medical History:   Diagnosis Date     CKD (chronic kidney disease) stage 3, GFR 30-59 ml/min (H) 6/14/2016     Unspecified schizophrenia, chronic condition with acute  exacerbation     Micky-Tasks unlimited     Past Surgical History:   Procedure Laterality Date     ESOPHAGOSCOPY, GASTROSCOPY, DUODENOSCOPY (EGD), COMBINED N/A 11/10/2016    Procedure: COMBINED ESOPHAGOSCOPY, GASTROSCOPY, DUODENOSCOPY (EGD), BIOPSY SINGLE OR MULTIPLE;  Surgeon: Thomas Sequeira MD;  Location:  GI     NO HISTORY OF SURGERY              Social History:     Social History     Occupational History     Occupation: [employer]   Tobacco Use     Smoking status: Former Smoker     Packs/day: 1.00     Years: 20.00     Pack years: 20.00     Types: Cigarettes     Last attempt to quit: 10/1/2011     Years since quittin.6     Smokeless tobacco: Never Used   Substance and Sexual Activity     Alcohol use: No     Alcohol/week: 0.0 oz     Drug use: No     Sexual activity: Never            Family History:     Family History   Problem Relation Age of Onset     Diabetes Mother      Diabetes Father      C.A.D. No family hx of      Hypertension No family hx of      Coronary Artery Disease No family hx of      Hyperlipidemia No family hx of      Cerebrovascular Disease No family hx of      Breast Cancer No family hx of      Colon Cancer No family hx of      Prostate Cancer No family hx of      Other Cancer No family hx of      Depression No family hx of      Anxiety Disorder No family hx of      Mental Illness No family hx of      Substance Abuse No family hx of      Anesthesia Reaction No family hx of      Asthma No family hx of      Osteoporosis No family hx of      Genetic Disorder No family hx of      Thyroid Disease No family hx of      Obesity No family hx of             Allergies:     Allergies   Allergen Reactions     No Known Drug Allergies             Medications:     Current Outpatient Medications   Medication Sig Dispense Refill     allopurinol (ZYLOPRIM) 300 MG tablet 300mg PO daily 90 tablet 0     COGENTIN 2 MG OR TABS 1 TABLET DAILY 30 0     omeprazole (PRILOSEC) 20 MG DR capsule TAKE ONE CAPSULE BY  "MOUTH DAILY. PATIENT NEEDS TO BE SEEN 30 capsule 11     RISPERDAL OR 3 mg daily       TRILAFON 8 MG OR TABS 52mg at hs       vitamin D2 (ERGOCALCIFEROL) 79839 units (1250 mcg) capsule Take 1 capsule (50,000 Units) by mouth every 7 days 6 capsule 0            Physical Exam:   Blood pressure 146/90, pulse 76, temperature 97.5  F (36.4  C), temperature source Oral, resp. rate 16, height 1.803 m (5' 11\"), weight 107 kg (236 lb), SpO2 98 %.  Wt Readings from Last 4 Encounters:   06/04/19 107 kg (236 lb)   04/26/19 103.4 kg (228 lb)   04/23/19 104.3 kg (230 lb)   04/01/19 102.6 kg (226 lb 4.8 oz)       Constitutional: well-developed, appearing stated age; cooperative  MS: Right knee effusion with reduction in ROM RESOLVED. Warmth and redness RESOLVED.   Right foot redness noted in MTPs more in 1st MTP with swelling RESOLVED. Tenderness present in MTPs- RESOLVED.   Skin: no rash in exposed areas  Psych: nl judgement, orientation, memory, affect.         Data:         William Reyna MD    [facility]    "

## 2019-06-04 ENCOUNTER — OFFICE VISIT (OUTPATIENT)
Dept: RHEUMATOLOGY | Facility: CLINIC | Age: 59
End: 2019-06-04
Payer: MEDICAID

## 2019-06-04 VITALS
WEIGHT: 236 LBS | TEMPERATURE: 97.5 F | BODY MASS INDEX: 33.04 KG/M2 | HEIGHT: 71 IN | SYSTOLIC BLOOD PRESSURE: 146 MMHG | OXYGEN SATURATION: 98 % | RESPIRATION RATE: 16 BRPM | DIASTOLIC BLOOD PRESSURE: 90 MMHG | HEART RATE: 76 BPM

## 2019-06-04 DIAGNOSIS — M1A.09X0 CHRONIC GOUT OF MULTIPLE SITES, UNSPECIFIED CAUSE: Primary | ICD-10-CM

## 2019-06-04 LAB
ALT SERPL W P-5'-P-CCNC: 37 U/L (ref 0–70)
AST SERPL W P-5'-P-CCNC: 20 U/L (ref 0–45)
BASOPHILS # BLD AUTO: 0 10E9/L (ref 0–0.2)
BASOPHILS NFR BLD AUTO: 0.7 %
CREAT SERPL-MCNC: 1.55 MG/DL (ref 0.66–1.25)
DIFFERENTIAL METHOD BLD: ABNORMAL
EOSINOPHIL # BLD AUTO: 0.2 10E9/L (ref 0–0.7)
EOSINOPHIL NFR BLD AUTO: 4.7 %
ERYTHROCYTE [DISTWIDTH] IN BLOOD BY AUTOMATED COUNT: 14.3 % (ref 10–15)
GFR SERPL CREATININE-BSD FRML MDRD: 49 ML/MIN/{1.73_M2}
HCT VFR BLD AUTO: 38.6 % (ref 40–53)
HGB BLD-MCNC: 13.6 G/DL (ref 13.3–17.7)
LYMPHOCYTES # BLD AUTO: 1 10E9/L (ref 0.8–5.3)
LYMPHOCYTES NFR BLD AUTO: 23.9 %
MCH RBC QN AUTO: 32.1 PG (ref 26.5–33)
MCHC RBC AUTO-ENTMCNC: 35.2 G/DL (ref 31.5–36.5)
MCV RBC AUTO: 91 FL (ref 78–100)
MONOCYTES # BLD AUTO: 0.4 10E9/L (ref 0–1.3)
MONOCYTES NFR BLD AUTO: 8.7 %
NEUTROPHILS # BLD AUTO: 2.6 10E9/L (ref 1.6–8.3)
NEUTROPHILS NFR BLD AUTO: 62 %
PLATELET # BLD AUTO: 182 10E9/L (ref 150–450)
RBC # BLD AUTO: 4.24 10E12/L (ref 4.4–5.9)
URATE SERPL-MCNC: 4.3 MG/DL (ref 3.5–7.2)
WBC # BLD AUTO: 4.2 10E9/L (ref 4–11)

## 2019-06-04 PROCEDURE — 84550 ASSAY OF BLOOD/URIC ACID: CPT | Performed by: INTERNAL MEDICINE

## 2019-06-04 PROCEDURE — 99213 OFFICE O/P EST LOW 20 MIN: CPT | Performed by: INTERNAL MEDICINE

## 2019-06-04 PROCEDURE — 84460 ALANINE AMINO (ALT) (SGPT): CPT | Performed by: INTERNAL MEDICINE

## 2019-06-04 PROCEDURE — 82565 ASSAY OF CREATININE: CPT | Performed by: INTERNAL MEDICINE

## 2019-06-04 PROCEDURE — 36415 COLL VENOUS BLD VENIPUNCTURE: CPT | Performed by: INTERNAL MEDICINE

## 2019-06-04 PROCEDURE — 84450 TRANSFERASE (AST) (SGOT): CPT | Performed by: INTERNAL MEDICINE

## 2019-06-04 PROCEDURE — 85025 COMPLETE CBC W/AUTO DIFF WBC: CPT | Performed by: INTERNAL MEDICINE

## 2019-06-04 ASSESSMENT — ROUTINE ASSESSMENT OF PATIENT INDEX DATA (RAPID3)
RAPID3 INTERPRETATION: LOW 3.1-6
TOTAL RAPID3 SCORE: 3.3

## 2019-06-04 ASSESSMENT — MIFFLIN-ST. JEOR: SCORE: 1912.62

## 2019-06-04 NOTE — NURSING NOTE
"Chief Complaint   Patient presents with     RECHECK       Initial There were no vitals taken for this visit. Estimated body mass index is 31.8 kg/m  as calculated from the following:    Height as of 4/23/19: 1.803 m (5' 11\").    Weight as of 4/26/19: 103.4 kg (228 lb).  Medication Reconciliation: complete    Have you ever seen a rheumatologist Yes  Who you When 4/23/19  Joint pain history  Onset:  Return visit. Pain has been relieved.   Involved joints: NA  Pain scale:  0/10     Wakes the patient from sleep : No  Morning stiffness:No  Meds used: Prednisone, methylprednisolone- has been unsure of instructions on when to reduce prednisone.     Interim history  Since last visit:  1. Infections - Yes- cat bite on finger  2. New symptoms/medical problem - No  3. Any side effects from Rheum medications - weight gain  3. ER visits/Hospitalizations/surgeries - No  4. Last PCP visit: 4/1/19  Wt Readings from Last 4 Encounters:   04/26/19 103.4 kg (228 lb)   04/23/19 104.3 kg (230 lb)   04/01/19 102.6 kg (226 lb 4.8 oz)   03/27/19 102.1 kg (225 lb)     BP Readings from Last 3 Encounters:   04/26/19 124/80   04/23/19 150/86   04/01/19 130/82       "

## 2019-06-04 NOTE — RESULT ENCOUNTER NOTE
Please send a letter to the patient with a copy of the lab results and also the following note:  Uric acid level is improved to < 5. Good.   Kidney function is better.

## 2019-06-04 NOTE — LETTER
[place]   [address]  [place]  [address]  [phone]      June 5, 2019      Hugo Zepeda                                                                                                                        [address] [place] [address]              Dear Hugo,    Uric acid level is improved to < 5. Good.   Kidney function is better.     Creatinine      Status:  Final result     Ref Range & Units 1d ago 3mo ago 1yr ago   Creatinine 0.66 - 1.25 mg/dL 1.55High   1.75High   1.49High     GFR Estimate >60 mL/min/{1.73_m2} 49Low   42Low  CM 49Low            Uric acid     Status:  Final result      Ref Range & Units 1d ago 3mo ago   Uric Acid 3.5 - 7.2 mg/dL 4.3  6.6            AST      Status:  Final result      Ref Range & Units 1d ago 11yr ago 14yr ago   AST 0 - 45 U/L 20  31 R 29            ALT     Status:  Final result      Ref Range & Units 1d ago 2yr ago 14yr ago   ALT 0 - 70 U/L 37  35  26            CBC with platelets differential     Status:  Final result      Ref Range & Units 1d ago 3mo ago   WBC 4.0 - 11.0 10e9/L 4.2  5.1    RBC Count 4.4 - 5.9 10e12/L 4.24Low   4.81    Hemoglobin 13.3 - 17.7 g/dL 13.6  14.5    Hematocrit 40.0 - 53.0 % 38.6Low   42.2    MCV 78 - 100 fl 91  88    MCH 26.5 - 33.0 pg 32.1  30.1    MCHC 31.5 - 36.5 g/dL 35.2  34.4    RDW 10.0 - 15.0 % 14.3  12.7    Platelet Count 150 - 450 10e9/L 182  187    % Neutrophils % 62.0  65.9    % Lymphocytes % 23.9  19.7    % Monocytes % 8.7  10.5    % Eosinophils % 4.7  3.7    % Basophils % 0.7  0.2    Absolute Neutrophil 1.6 - 8.3 10e9/L 2.6  3.4    Absolute Lymphocytes 0.8 - 5.3 10e9/L 1.0  1.0    Absolute Monocytes 0.0 - 1.3 10e9/L 0.4  0.5    Absolute Eosinophils 0.0 - 0.7 10e9/L 0.2  0.2    Absolute Basophils 0.0 - 0.2 10e9/L 0.0  0.0            Dr Reyna  Rheumatology

## 2019-09-27 DIAGNOSIS — M1A.09X0 CHRONIC GOUT OF MULTIPLE SITES, UNSPECIFIED CAUSE: ICD-10-CM

## 2019-09-27 NOTE — TELEPHONE ENCOUNTER
"Requested Prescriptions   Pending Prescriptions Disp Refills     allopurinol (ZYLOPRIM) 300 MG tablet 90 tablet 0     Simg PO daily   Last Written Prescription Date:  2019  Last Fill Quantity: 90,  # refills: 0   Last Office Visit: 2019   Future Office Visit:         Gout Agents Protocol Failed - 2019  8:13 AM        Failed - Normal serum creatinine on file in the past 12 months     Recent Labs   Lab Test 19  1049   CR 1.55*             Passed - CBC on file in past 12 months     Recent Labs   Lab Test 19  1049   WBC 4.2   RBC 4.24*   HGB 13.6   HCT 38.6*                    Passed - ALT on file in past 12 months     Recent Labs   Lab Test 19  1049   ALT 37             Passed - Has Uric Acid on file in past 12 months and value is less than 6     Recent Labs   Lab Test 19  1049   URIC 4.3     If level is 6mg/dL or greater, ok to refill one time and refer to provider.           Passed - Recent (12 mo) or future (30 days) visit within the authorizing provider's specialty     Patient had office visit in the last 12 months or has a visit in the next 30 days with authorizing provider or within the authorizing provider's specialty.  See \"Patient Info\" tab in inbasket, or \"Choose Columns\" in Meds & Orders section of the refill encounter.              Passed - Medication is active on med list        Passed - Patient is age 18 or older          "

## 2019-09-30 RX ORDER — ALLOPURINOL 300 MG/1
TABLET ORAL
Qty: 90 TABLET | Refills: 0 | Status: SHIPPED | OUTPATIENT
Start: 2019-09-30 | End: 2019-12-23

## 2019-12-10 ENCOUNTER — OFFICE VISIT (OUTPATIENT)
Dept: INTERNAL MEDICINE | Facility: CLINIC | Age: 59
End: 2019-12-10
Payer: MEDICAID

## 2019-12-10 VITALS
DIASTOLIC BLOOD PRESSURE: 70 MMHG | SYSTOLIC BLOOD PRESSURE: 116 MMHG | TEMPERATURE: 97.9 F | OXYGEN SATURATION: 98 % | WEIGHT: 231.9 LBS | HEART RATE: 89 BPM | BODY MASS INDEX: 32.47 KG/M2 | HEIGHT: 71 IN

## 2019-12-10 DIAGNOSIS — H61.23 BILATERAL IMPACTED CERUMEN: Primary | ICD-10-CM

## 2019-12-10 PROCEDURE — 69210 REMOVE IMPACTED EAR WAX UNI: CPT | Mod: 50 | Performed by: INTERNAL MEDICINE

## 2019-12-10 ASSESSMENT — MIFFLIN-ST. JEOR: SCORE: 1894.02

## 2019-12-10 NOTE — NURSING NOTE
Patient identified using two patient identifiers.  Ear exam showing wax occlusion completed by provider.  Solution: warm water was placed in the bilateral ear(s) via irrigation tool: elephant ear.      Martha Quintero CMA

## 2019-12-10 NOTE — NURSING NOTE
"Chief Complaint   Patient presents with     Ear Problem     /70   Pulse 89   Temp 97.9  F (36.6  C) (Temporal)   Ht 1.803 m (5' 11\")   Wt 105.2 kg (231 lb 14.4 oz)   SpO2 98%   BMI 32.34 kg/m   Estimated body mass index is 32.34 kg/m  as calculated from the following:    Height as of this encounter: 1.803 m (5' 11\").    Weight as of this encounter: 105.2 kg (231 lb 14.4 oz).        Health Maintenance due pending provider review:  NONE    n/a    Martha Quintero CMA  "

## 2019-12-10 NOTE — PROGRESS NOTES
"Subjective     Hugo Zepeda is a 59 year old male who presents to clinic today for the following health issues:    HPI   Ear problem      Duration: 1+ month    Description (location/character/radiation): pt denies any pain, but muffled hearing in both ears    Intensity:  moderate    Accompanying signs and symptoms: denies any drainage from ears    History (similar episodes/previous evaluation): None    Precipitating or alleviating factors: None    Therapies tried and outcome: None               Reviewed and updated as needed this visit by Provider             Objective    Pulse 89   Temp 97.9  F (36.6  C) (Temporal)   Ht 1.803 m (5' 11\")   Wt 105.2 kg (231 lb 14.4 oz)   SpO2 98%   BMI 32.34 kg/m    Body mass index is 32.34 kg/m .  Physical Exam   GENERAL APPEARANCE: alert and no distress  HENT: ear canals obstructed w/cerumen bilat.   After removal TM clear.    Manual disimpaction with curette performed by MD followed by irrigation for complete removal of any excess cerumen by MA    none         Assessment & Plan     1. Bilateral impacted cerumen  As above- successful removal. Return prn  - REMOVE IMPACTED CERUMEN     BMI:   Estimated body mass index is 32.34 kg/m  as calculated from the following:    Height as of this encounter: 1.803 m (5' 11\").    Weight as of this encounter: 105.2 kg (231 lb 14.4 oz).   Weight management plan: Discussed healthy diet and exercise guidelines    No follow-ups on file.    Arsh Sommer MD  [facility]        "

## 2020-02-03 ENCOUNTER — TELEPHONE (OUTPATIENT)
Dept: PODIATRY | Facility: CLINIC | Age: 60
End: 2020-02-03

## 2020-02-03 ENCOUNTER — OFFICE VISIT (OUTPATIENT)
Dept: INTERNAL MEDICINE | Facility: CLINIC | Age: 60
End: 2020-02-03
Payer: MEDICAID

## 2020-02-03 VITALS
OXYGEN SATURATION: 100 % | RESPIRATION RATE: 20 BRPM | SYSTOLIC BLOOD PRESSURE: 128 MMHG | HEIGHT: 71 IN | WEIGHT: 240.4 LBS | DIASTOLIC BLOOD PRESSURE: 82 MMHG | BODY MASS INDEX: 33.65 KG/M2 | TEMPERATURE: 97.4 F | HEART RATE: 104 BPM

## 2020-02-03 DIAGNOSIS — M76.61 TENDONITIS, ACHILLES, RIGHT: Primary | ICD-10-CM

## 2020-02-03 DIAGNOSIS — N18.30 CKD (CHRONIC KIDNEY DISEASE) STAGE 3, GFR 30-59 ML/MIN (H): ICD-10-CM

## 2020-02-03 PROCEDURE — 99214 OFFICE O/P EST MOD 30 MIN: CPT | Performed by: PHYSICIAN ASSISTANT

## 2020-02-03 RX ORDER — METHYLPREDNISOLONE 4 MG
TABLET, DOSE PACK ORAL
Qty: 21 TABLET | Refills: 0 | Status: SHIPPED | OUTPATIENT
Start: 2020-02-03 | End: 2021-01-05

## 2020-02-03 ASSESSMENT — MIFFLIN-ST. JEOR: SCORE: 1932.58

## 2020-02-03 NOTE — PATIENT INSTRUCTIONS
FOOT CARE NURSES  If you are interested in having a foot care nurse come out to your   home, please call one of these contacts for more information:    [facility]  Dian JOSEF'Ahmadi  [phone]  (Travels to your home) [facility]  [phone]  (Travels to your home)   Jerord Epperson DPM  [address] [place] [address] [phone] [facility]  [phone]  (Travels to your home)   [facility]  [address] [place] [address] [phone] [facility]  [phone]  [place] [facility] [place] [facility]   Pepper Rivera DPM  [address], [place] [id number] [phone] [facility]   [phone]  [facility]   [facility]  [phone]  At [facility]  (does not take BCBS) [facility]   [phone]     Icing    Medrol dose joaquin

## 2020-02-03 NOTE — LETTER
February 3, 2020      Hugo Zepeda  [address]  [place] [address]        To Whom It May Concern:    Hugo Zepeda  was seen on 2/3/2020  Please excuse him from work due to right foot/ankle pain on 2/4/2020        Sincerely,        Berna Chamberlain PA-C

## 2020-02-03 NOTE — PROGRESS NOTES
"Subjective     Hugo Zepeda is a 59 year old male who presents to clinic today for the following health issues:    HPI   Musculoskeletal problem/pain      Duration: since Friday night after taking off shoe at night (01/31/20)    Description  Location: right heel    Intensity:  severe    Accompanying signs and symptoms: swelling    History  Previous similar problem: no   Previous evaluation:  none    Precipitating or alleviating factors:  Trauma or overuse: YES- he thinks pulling off his shoe caused the pain  Aggravating factors include: standing and walking    Therapies tried and outcome: nothing    -------------------------------------    BP Readings from Last 3 Encounters:   02/03/20 128/82   12/10/19 116/70   06/04/19 146/90    Wt Readings from Last 3 Encounters:   02/03/20 109 kg (240 lb 6.4 oz)   12/10/19 105.2 kg (231 lb 14.4 oz)   06/04/19 107 kg (236 lb)                    Reviewed and updated as needed this visit by Provider  Allergies  Meds         Review of Systems   ROS COMP: Constitutional, HEENT, cardiovascular, pulmonary, gi and gu systems are negative, except as otherwise noted.      Objective    /82   Pulse 104   Temp 97.4  F (36.3  C) (Oral)   Resp 20   Ht 1.803 m (5' 11\")   Wt 109 kg (240 lb 6.4 oz)   SpO2 100%   BMI 33.53 kg/m    Body mass index is 33.53 kg/m .  Physical Exam   GENERAL: healthy, alert and no distress  RESP: lungs clear to auscultation - no rales, rhonchi or wheezes  CV: regular rates and rhythm  MS: right foot/ankle  Redness swelling and tenderness at the achilles tendon insertion.  No pain with ROM of the ankle joint.   No pain at the calcaneous with palpation   Very long toenails in need of trimming   SKIN: no suspicious lesions or rashes  NEURO: Normal strength and tone, mentation intact and speech normal    Diagnostic Test Results:  none         Assessment & Plan     1. Tendonitis, Achilles, right    - methylPREDNISolone (MEDROL DOSEPAK) 4 MG tablet therapy " pack; Follow Package Directions  Dispense: 21 tablet; Refill: 0       2: CKD-    Avoid  NSAIDs      Icing Medrol dose joaquin  Letter off work tomorrow  Recheck if not improving  Info on nail cares      Return in about 2 months (around 4/3/2020) for Routine Visit, regular primary provider.    Berna Chamberlain PA-C  [facility]

## 2020-02-18 DIAGNOSIS — K21.9 GASTROESOPHAGEAL REFLUX DISEASE WITHOUT ESOPHAGITIS: ICD-10-CM

## 2020-06-21 DIAGNOSIS — M1A.09X0 CHRONIC GOUT OF MULTIPLE SITES, UNSPECIFIED CAUSE: ICD-10-CM

## 2020-06-23 RX ORDER — ALLOPURINOL 300 MG/1
TABLET ORAL
Qty: 90 TABLET | Refills: 0 | Status: SHIPPED | OUTPATIENT
Start: 2020-06-23 | End: 2020-10-29

## 2020-06-23 NOTE — TELEPHONE ENCOUNTER
"Refill done for patient,   But he is due for labs and video/virtual visit.  Please schedule patient for visit with PCP and then he can order labs etc.           Requested Prescriptions   Pending Prescriptions Disp Refills     allopurinol (ZYLOPRIM) 300 MG tablet [Pharmacy Med Name: ALLOPURINOL 300MG TABLETS] 90 tablet 0     Sig: TAKE 1 TABLET BY MOUTH DAILY       Gout Agents Protocol Failed - 6/23/2020  9:01 AM        Failed - CBC on file in past 12 months     Recent Labs   Lab Test 06/04/19  1049   WBC 4.2   RBC 4.24*   HGB 13.6   HCT 38.6*                    Failed - ALT on file in past 12 months     Recent Labs   Lab Test 06/04/19  1049   ALT 37             Failed - Has Uric Acid on file in past 12 months and value is less than 6     Recent Labs   Lab Test 06/04/19  1049   URIC 4.3     If level is 6mg/dL or greater, ok to refill one time and refer to provider.           Failed - Normal serum creatinine on file in the past 12 months     Recent Labs   Lab Test 06/04/19  1049   CR 1.55*       Ok to refill medication if creatinine is low          Passed - Recent (12 mo) or future (30 days) visit within the authorizing provider's specialty     Patient has had an office visit with the authorizing provider or a provider within the authorizing providers department within the previous 12 mos or has a future within next 30 days. See \"Patient Info\" tab in inbasket, or \"Choose Columns\" in Meds & Orders section of the refill encounter.              Passed - Medication is active on med list        Passed - Patient is age 18 or older           "

## 2020-06-23 NOTE — TELEPHONE ENCOUNTER
Routing refill request to provider for review/approval because:  Labs not current:  Creat, uric acid, cbc

## 2020-07-01 NOTE — TELEPHONE ENCOUNTER
Left voicemail informing patient that he is due for labs and video visit. Please schedule when patient returns call and MR can order future labs.    LEIGHA Altamirano

## 2020-07-06 NOTE — TELEPHONE ENCOUNTER
Spoke with  Dyana, patient 's care co-ordinator at mobile number listed for scheduling. Gave Dyana message re: refill approved and need for labs (advised mal) and follow up virtual visit. Dyana states patient is unable to do video visit so I suggested telephone visit instead. Dyana will speak to patient to get scheduling date availability. Gave clinic number and advised how to correctly schedule these appointments.  Eri Dumont CMA.

## 2020-07-30 ENCOUNTER — DOCUMENTATION ONLY (OUTPATIENT)
Dept: LAB | Facility: CLINIC | Age: 60
End: 2020-07-30

## 2020-07-30 DIAGNOSIS — Z13.1 SCREENING FOR DIABETES MELLITUS: ICD-10-CM

## 2020-07-30 DIAGNOSIS — E78.1 HYPERTRIGLYCERIDEMIA: Primary | ICD-10-CM

## 2020-07-30 DIAGNOSIS — Z12.5 SCREENING FOR PROSTATE CANCER: ICD-10-CM

## 2020-08-04 DIAGNOSIS — Z11.4 SCREENING FOR HIV (HUMAN IMMUNODEFICIENCY VIRUS): ICD-10-CM

## 2020-08-04 DIAGNOSIS — Z13.1 SCREENING FOR DIABETES MELLITUS: ICD-10-CM

## 2020-08-04 DIAGNOSIS — Z12.5 SCREENING FOR PROSTATE CANCER: ICD-10-CM

## 2020-08-04 DIAGNOSIS — E78.1 HYPERTRIGLYCERIDEMIA: ICD-10-CM

## 2020-08-04 DIAGNOSIS — Z11.59 NEED FOR HEPATITIS C SCREENING TEST: ICD-10-CM

## 2020-08-04 LAB
ANION GAP SERPL CALCULATED.3IONS-SCNC: 6 MMOL/L (ref 3–14)
BUN SERPL-MCNC: 13 MG/DL (ref 7–30)
CALCIUM SERPL-MCNC: 9 MG/DL (ref 8.5–10.1)
CHLORIDE SERPL-SCNC: 108 MMOL/L (ref 94–109)
CHOLEST SERPL-MCNC: 224 MG/DL
CO2 SERPL-SCNC: 25 MMOL/L (ref 20–32)
CREAT SERPL-MCNC: 1.63 MG/DL (ref 0.66–1.25)
GFR SERPL CREATININE-BSD FRML MDRD: 45 ML/MIN/{1.73_M2}
GLUCOSE SERPL-MCNC: 93 MG/DL (ref 70–99)
HDLC SERPL-MCNC: 45 MG/DL
LDLC SERPL CALC-MCNC: 146 MG/DL
NONHDLC SERPL-MCNC: 179 MG/DL
POTASSIUM SERPL-SCNC: 4 MMOL/L (ref 3.4–5.3)
PSA SERPL-ACNC: 0.5 UG/L (ref 0–4)
SODIUM SERPL-SCNC: 139 MMOL/L (ref 133–144)
TRIGL SERPL-MCNC: 164 MG/DL

## 2020-08-04 PROCEDURE — G0472 HEP C SCREEN HIGH RISK/OTHER: HCPCS | Performed by: INTERNAL MEDICINE

## 2020-08-04 PROCEDURE — 87389 HIV-1 AG W/HIV-1&-2 AB AG IA: CPT | Performed by: INTERNAL MEDICINE

## 2020-08-04 PROCEDURE — 80048 BASIC METABOLIC PNL TOTAL CA: CPT | Performed by: INTERNAL MEDICINE

## 2020-08-04 PROCEDURE — 80061 LIPID PANEL: CPT | Performed by: INTERNAL MEDICINE

## 2020-08-04 PROCEDURE — G0103 PSA SCREENING: HCPCS | Performed by: INTERNAL MEDICINE

## 2020-08-04 PROCEDURE — 36415 COLL VENOUS BLD VENIPUNCTURE: CPT | Performed by: INTERNAL MEDICINE

## 2020-08-05 LAB
HCV AB SERPL QL IA: NONREACTIVE
HIV 1+2 AB+HIV1 P24 AG SERPL QL IA: NONREACTIVE

## 2020-10-29 DIAGNOSIS — M1A.09X0 CHRONIC GOUT OF MULTIPLE SITES, UNSPECIFIED CAUSE: ICD-10-CM

## 2020-10-29 RX ORDER — ALLOPURINOL 300 MG/1
1 TABLET ORAL DAILY
Qty: 30 TABLET | Refills: 0 | Status: SHIPPED | OUTPATIENT
Start: 2020-10-29 | End: 2020-11-18

## 2020-10-29 NOTE — TELEPHONE ENCOUNTER
Reason for call:  [facility] calling in refill for Allopurninl    Phone number to reach patient:  Other phone number:  [phone]--[facility]    Best Time:  anytime    Can we leave a detailed message on this number?  YES    Travel screening: Negative

## 2020-10-29 NOTE — LETTER
[facility]  [address] [id number] [phone]            Hugo Zepeda  [address] [place] [address]        November 5, 2020    Dear Jose,    While refilling your prescription today, we noticed that you are due for an appointment with your provider.  We will refill your prescription for 30 days, but a follow-up appointment must be made before any additional refills can be approved.     Taking care of your health is important to us and we look forward to seeing you in the near future.  Please call us at [phone] or [phone] (or use [employer]) to schedule an appointment.     Please disregard this notice if you have already made an appointment.    Sincerely,        [facility]

## 2020-10-29 NOTE — TELEPHONE ENCOUNTER
Routing refill request to provider for review/approval because:  Labs not current:  Cbc, alt, uric acid, creat

## 2020-11-18 DIAGNOSIS — M1A.09X0 CHRONIC GOUT OF MULTIPLE SITES, UNSPECIFIED CAUSE: ICD-10-CM

## 2020-11-18 RX ORDER — ALLOPURINOL 300 MG/1
TABLET ORAL
Qty: 30 TABLET | Refills: 0 | Status: SHIPPED | OUTPATIENT
Start: 2020-11-18 | End: 2020-12-15

## 2020-11-18 NOTE — TELEPHONE ENCOUNTER
Allopurinol    Routing refill request to provider for review/approval because:  Labs not current:  Uric Acid, CBC, ALT    Last office visit: 2/3/2020   Future Office Visit:

## 2020-12-14 DIAGNOSIS — M1A.09X0 CHRONIC GOUT OF MULTIPLE SITES, UNSPECIFIED CAUSE: ICD-10-CM

## 2020-12-15 RX ORDER — ALLOPURINOL 300 MG/1
TABLET ORAL
Qty: 90 TABLET | Refills: 0 | Status: SHIPPED | OUTPATIENT
Start: 2020-12-15 | End: 2021-01-05

## 2020-12-15 NOTE — TELEPHONE ENCOUNTER
Routing refill request to provider for review/approval because:  Labs out of range:  creat  Labs not current:  Cbc, alt, uric acid

## 2021-01-05 ENCOUNTER — OFFICE VISIT (OUTPATIENT)
Dept: INTERNAL MEDICINE | Facility: CLINIC | Age: 61
End: 2021-01-05
Payer: MEDICAID

## 2021-01-05 VITALS
OXYGEN SATURATION: 98 % | BODY MASS INDEX: 30.15 KG/M2 | SYSTOLIC BLOOD PRESSURE: 116 MMHG | HEART RATE: 78 BPM | WEIGHT: 215.4 LBS | TEMPERATURE: 97.4 F | DIASTOLIC BLOOD PRESSURE: 80 MMHG | HEIGHT: 71 IN

## 2021-01-05 DIAGNOSIS — K21.9 GASTROESOPHAGEAL REFLUX DISEASE WITHOUT ESOPHAGITIS: ICD-10-CM

## 2021-01-05 DIAGNOSIS — M1A.09X0 CHRONIC GOUT OF MULTIPLE SITES, UNSPECIFIED CAUSE: ICD-10-CM

## 2021-01-05 DIAGNOSIS — H61.23 BILATERAL IMPACTED CERUMEN: ICD-10-CM

## 2021-01-05 DIAGNOSIS — N18.31 STAGE 3A CHRONIC KIDNEY DISEASE (H): ICD-10-CM

## 2021-01-05 DIAGNOSIS — Z00.00 ROUTINE GENERAL MEDICAL EXAMINATION AT A HEALTH CARE FACILITY: Primary | ICD-10-CM

## 2021-01-05 LAB
ALBUMIN UR-MCNC: NEGATIVE MG/DL
ANION GAP SERPL CALCULATED.3IONS-SCNC: 5 MMOL/L (ref 3–14)
APPEARANCE UR: CLEAR
BILIRUB UR QL STRIP: NEGATIVE
BUN SERPL-MCNC: 21 MG/DL (ref 7–30)
CALCIUM SERPL-MCNC: 8.9 MG/DL (ref 8.5–10.1)
CHLORIDE SERPL-SCNC: 109 MMOL/L (ref 94–109)
CO2 SERPL-SCNC: 26 MMOL/L (ref 20–32)
COLOR UR AUTO: YELLOW
CREAT SERPL-MCNC: 1.83 MG/DL (ref 0.66–1.25)
CREAT UR-MCNC: 122 MG/DL
GFR SERPL CREATININE-BSD FRML MDRD: 39 ML/MIN/{1.73_M2}
GLUCOSE SERPL-MCNC: 107 MG/DL (ref 70–99)
GLUCOSE UR STRIP-MCNC: NEGATIVE MG/DL
HGB UR QL STRIP: NEGATIVE
KETONES UR STRIP-MCNC: NEGATIVE MG/DL
LEUKOCYTE ESTERASE UR QL STRIP: NEGATIVE
MICROALBUMIN UR-MCNC: 5 MG/L
MICROALBUMIN/CREAT UR: 4.44 MG/G CR (ref 0–17)
NITRATE UR QL: NEGATIVE
PH UR STRIP: 6 PH (ref 5–7)
POTASSIUM SERPL-SCNC: 3.9 MMOL/L (ref 3.4–5.3)
SODIUM SERPL-SCNC: 140 MMOL/L (ref 133–144)
SOURCE: NORMAL
SP GR UR STRIP: 1.01 (ref 1–1.03)
URATE SERPL-MCNC: 4.9 MG/DL (ref 3.5–7.2)
UROBILINOGEN UR STRIP-ACNC: 0.2 EU/DL (ref 0.2–1)

## 2021-01-05 PROCEDURE — 69210 REMOVE IMPACTED EAR WAX UNI: CPT | Mod: 50 | Performed by: INTERNAL MEDICINE

## 2021-01-05 PROCEDURE — 90471 IMMUNIZATION ADMIN: CPT | Performed by: INTERNAL MEDICINE

## 2021-01-05 PROCEDURE — 84550 ASSAY OF BLOOD/URIC ACID: CPT | Performed by: INTERNAL MEDICINE

## 2021-01-05 PROCEDURE — 99396 PREV VISIT EST AGE 40-64: CPT | Mod: 25 | Performed by: INTERNAL MEDICINE

## 2021-01-05 PROCEDURE — 36415 COLL VENOUS BLD VENIPUNCTURE: CPT | Performed by: INTERNAL MEDICINE

## 2021-01-05 PROCEDURE — 80048 BASIC METABOLIC PNL TOTAL CA: CPT | Performed by: INTERNAL MEDICINE

## 2021-01-05 PROCEDURE — 81003 URINALYSIS AUTO W/O SCOPE: CPT | Performed by: INTERNAL MEDICINE

## 2021-01-05 PROCEDURE — 90682 RIV4 VACC RECOMBINANT DNA IM: CPT | Performed by: INTERNAL MEDICINE

## 2021-01-05 PROCEDURE — 99214 OFFICE O/P EST MOD 30 MIN: CPT | Mod: 25 | Performed by: INTERNAL MEDICINE

## 2021-01-05 PROCEDURE — 82043 UR ALBUMIN QUANTITATIVE: CPT | Performed by: INTERNAL MEDICINE

## 2021-01-05 RX ORDER — DOXEPIN HYDROCHLORIDE 50 MG/1
CAPSULE ORAL
COMMUNITY
Start: 2020-12-14

## 2021-01-05 RX ORDER — ALLOPURINOL 300 MG/1
1 TABLET ORAL DAILY
Qty: 90 TABLET | Refills: 3 | Status: SHIPPED | OUTPATIENT
Start: 2021-01-05 | End: 2021-12-16

## 2021-01-05 ASSESSMENT — MIFFLIN-ST. JEOR: SCORE: 1814.18

## 2021-01-05 NOTE — NURSING NOTE
"Chief Complaint   Patient presents with     Physical     pt is fasting     /80   Pulse 78   Temp 97.4  F (36.3  C) (Temporal)   Ht 1.803 m (5' 11\")   Wt 97.7 kg (215 lb 6.4 oz)   SpO2 98%   BMI 30.04 kg/m   Estimated body mass index is 30.04 kg/m  as calculated from the following:    Height as of this encounter: 1.803 m (5' 11\").    Weight as of this encounter: 97.7 kg (215 lb 6.4 oz).            Martha Quintero CMA  "

## 2021-01-05 NOTE — PATIENT INSTRUCTIONS
Preventive Health Recommendations  Male Ages 50 - 64    Yearly exam:             See your health care provider every year in order to  o   Review health changes.   o   Discuss preventive care.    o   Review your medicines if your doctor has prescribed any.     Have a cholesterol test every 5 years, or more frequently if you are at risk for high cholesterol/heart disease.     Have a diabetes test (fasting glucose) every three years. If you are at risk for diabetes, you should have this test more often.     Have a colonoscopy at age 50, or have a yearly FIT test (stool test). These exams will check for colon cancer.      Talk with your health care provider about whether or not a prostate cancer screening test (PSA) is right for you.    You should be tested each year for STDs (sexually transmitted diseases), if you re at risk.     Shots: Get a flu shot each year. Get a tetanus shot every 10 years.     Nutrition:    Eat at least 5 servings of fruits and vegetables daily.     Eat whole-grain bread, whole-wheat pasta and brown rice instead of white grains and rice.     Get adequate Calcium and Vitamin D.     Lifestyle    Exercise for at least 150 minutes a week (30 minutes a day, 5 days a week). This will help you control your weight and prevent disease.     Limit alcohol to one drink per day.     No smoking.     Wear sunscreen to prevent skin cancer.     See your dentist every six months for an exam and cleaning.     See your eye doctor every 1 to 2 years.  The heart-healthy Mediterranean is a healthy eating plan based on typical foods and recipes of Mediterranean-style cooking. Here's how to adopt the Mediterranean diet.   If you're looking for a heart-healthy eating plan, the Mediterranean diet might be right for you. The Mediterranean diet incorporates the basics of healthy eating -- plus a splash of flavorful olive oil and perhaps even a glass of red wine -- among other components characterizing the traditional  cooking style of countries bordering the [place].  Most healthy diets include fruits, vegetables, fish and whole grains, and limit unhealthy fats. While these parts of a healthy diet remain tried-and-true, subtle variations or differences in proportions of certain foods may make a difference in your risk of heart disease.  Research has shown that the traditional Mediterranean diet reduces the risk of heart disease. In fact, an analysis of more than 1.5 million healthy adults demonstrated that following a Mediterranean diet was associated with a reduced risk of death from heart disease and cancer, as well as a reduced incidence of Parkinson's and Alzheimer's diseases.   The Dietary Guidelines for Americans recommends the Mediterranean diet as an eating plan that can help promote health and prevent disease. And the Mediterranean diet is one your whole family can follow for good health.   The Mediterranean diet emphasizes:  Eating primarily plant-based foods, such as fruits and vegetables, whole grains, legumes and nuts   Replacing butter with healthy fats, such as olive oil   Using herbs and spices instead of salt to flavor foods   Limiting red meat to no more than a few times a month   Eating fish and poultry at least twice a week   Drinking red wine in moderation (optional)  The diet also recognizes the importance of being physically active, and enjoying meals with family and friends.  The Mediterranean diet traditionally includes fruits, vegetables and grains. For example, residents of [facility] average six or more servings a day of antioxidant-rich fruits and vegetables.  Grains in the Mediterranean region are typically whole grain and usually contain very few unhealthy trans fats, and bread is an important part of the diet. However, throughout the Mediterranean region, bread is eaten plain or dipped in olive oil -- not eaten with butter or margarine, which contains saturated or trans fats.   Nuts are  "another part of a healthy Mediterranean diet. Nuts are high in fat, but most of the fat is healthy. Because nuts are high in calories, they should not be eaten in large amounts -- generally no more than a handful a day. For the best nutrition, avoid candied or honey-roasted and heavily salted nuts.  The focus of the Mediterranean diet isn't on limiting total fat consumption, but rather on choosing healthier types of fat. The Mediterranean diet discourages saturated fats and hydrogenated oils (trans fats), both of which contribute to heart disease.  The Mediterranean diet features olive oil as the primary source of fat. Olive oil is mainly monounsaturated fat -- a type of fat that can help reduce low-density lipoprotein (LDL) cholesterol levels when used in place of saturated or trans fats. \"Extra-virgin\" and \"virgin\" olive oils (the least processed forms) also contain the highest levels of protective plant compounds that provide antioxidant effects.  Canola oil and some nuts contain the beneficial linolenic acid (a type of omega-3 fatty acid) in addition to healthy unsaturated fat. Omega-3 fatty acids lower triglycerides, decrease blood clotting, and are associated with decreased incidence of sudden heart attacks, improve the health of your blood vessels, and help moderate blood pressure. Fatty fish -- such as mackerel, lake trout, herring, sardines, albacore tuna and salmon -- are rich sources of omega-3 fatty acids. Fish is eaten on a regular basis in the Mediterranean diet.  The health effects of alcohol have been debated for many years, and some doctors are reluctant to encourage alcohol consumption because of the health consequences of excessive drinking. However, alcohol -- in moderation -- has been associated with a reduced risk of heart disease in some research studies.  The Mediterranean diet typically includes a moderate amount of wine, usually red wine. This means no more than 5 ounces (148 milliliters) " of wine daily for women of all ages and men older than age 65 and no more than 10 ounces (296 milliliters) of wine daily for younger men. More than this may increase the risk of health problems, including increased risk of certain types of cancer.   If you're unable to limit your alcohol intake to the amounts defined above, if you have a personal or family history of alcohol abuse, or if you have heart or liver disease, refrain from drinking wine or any other alcohol.  The Mediterranean diet is a delicious and healthy way to eat. Many people who switch to this style of eating say they'll never eat any other way. Here are some specific steps to get you started:   Eat your veggies and fruits -- and switch to whole grains. Avariety of plant foods should make up the majority of your meals. They should be minimally processed -- fresh and whole are best. Include veggies and fruits in every meal and eat them for snacks as well. Switch to whole-grain bread and cereal, and begin to eat more whole-grain rice and pasta products. Keep baby carrots, apples and bananas on hand for quick, satisfying snacks. Fruit salads are a wonderful way to eat a variety of healthy fruit.   Go nuts. Nuts and seeds are good sources of fiber, protein and healthy fats. Keep almonds, cashews, pistachios and walnuts on hand for a quick snack. Choose natural peanut butter, rather than the kind with hydrogenated fat added. Try blended sesame seeds (tahini) as a dip or spread for bread.   Pass on the butter. Try olive or canola oil as a healthy replacement for butter or margarine. Lightly drizzle it over vegetables. After cooking pasta, add a touch of olive oil, some garlic and green onions for flavoring. Dip bread in flavored olive oil or lightly spread it on whole-grain bread for a tasty alternative to butter. Try tahini as a dip or spread for bread too.   Spice it up. Herbs and spices make food tasty and can  for salt and fat in recipes.   Go  fish. Eat fish at least twice a week. Fresh or water-packed tuna, salmon, trout, mackerel and herring are healthy choices. [place], bake or broil fish for great taste and easy cleanup. Avoid breaded and fried fish.   Rein in the red meat. Limit red meat to no more than a few times a month. Substitute fish and poultry for red meat. When choosing red meat, make sure it's lean and keep portions small (about the size of a deck of cards). Also avoid sausage, chavez and other high-fat, processed meats.   Choose low-fat dairy. Limit higher fat dairy products, such as whole or 2 percent milk, cheese and ice cream. Switch to skim milk, fat-free yogurt and low-fat cheese

## 2021-01-05 NOTE — LETTER
January 13, 2021      Hugo R Duane  [address] [place] [address]        Dear ,    We are writing to inform you of your test results.    Your lab results are stable.    Resulted Orders   Uric acid   Result Value Ref Range    Uric Acid 4.9 3.5 - 7.2 mg/dL   UA reflex to Microscopic   Result Value Ref Range    Color Urine Yellow     Appearance Urine Clear     Glucose Urine Negative NEG^Negative mg/dL    Bilirubin Urine Negative NEG^Negative    Ketones Urine Negative NEG^Negative mg/dL    Specific Gravity Urine 1.010 1.003 - 1.035    Blood Urine Negative NEG^Negative    pH Urine 6.0 5.0 - 7.0 pH    Protein Albumin Urine Negative NEG^Negative mg/dL    Urobilinogen Urine 0.2 0.2 - 1.0 EU/dL    Nitrite Urine Negative NEG^Negative    Leukocyte Esterase Urine Negative NEG^Negative    Source Midstream Urine    Albumin Random Urine Quantitative with Creat Ratio   Result Value Ref Range    Creatinine Urine 122 mg/dL    Albumin Urine mg/L 5 mg/L    Albumin Urine mg/g Cr 4.44 0 - 17 mg/g Cr   Basic metabolic panel   Result Value Ref Range    Sodium 140 133 - 144 mmol/L    Potassium 3.9 3.4 - 5.3 mmol/L    Chloride 109 94 - 109 mmol/L    Carbon Dioxide 26 20 - 32 mmol/L    Anion Gap 5 3 - 14 mmol/L    Glucose 107 (H) 70 - 99 mg/dL    Urea Nitrogen 21 7 - 30 mg/dL    Creatinine 1.83 (H) 0.66 - 1.25 mg/dL    GFR Estimate 39 (L) >60 mL/min/[1.73_m2]      Comment:      Non  GFR Calc  Starting 12/18/2018, serum creatinine based estimated GFR (eGFR) will be   calculated using the Chronic Kidney Disease Epidemiology Collaboration   (CKD-EPI) equation.      GFR Estimate If Black 45 (L) >60 mL/min/[1.73_m2]      Comment:       GFR Calc  Starting 12/18/2018, serum creatinine based estimated GFR (eGFR) will be   calculated using the Chronic Kidney Disease Epidemiology Collaboration   (CKD-EPI) equation.      Calcium 8.9 8.5 - 10.1 mg/dL       If you have any questions or concerns, please call  the clinic at the number listed above.       Sincerely,      Arsh Sommer MD

## 2021-01-05 NOTE — PROGRESS NOTES
SUBJECTIVE:   CC: Hugo Zepeda is an 60 year old male who presents for preventative health visit.       Patient has been advised of split billing requirements and indicates understanding: Yes  Healthy Habits:     Getting at least 3 servings of Calcium per day:  NO    Bi-annual eye exam:  Yes    Dental care twice a year:  NO    Sleep apnea or symptoms of sleep apnea:  None    Diet:  Regular (no restrictions)    Frequency of exercise:  None    Taking medications regularly:  Yes    Barriers to taking medications:  None    Medication side effects:  None    PHQ-2 Total Score: 0    Additional concerns today:  Yes (ears flushed)              Today's PHQ-2 Score:   PHQ-2 (  [employer]) 2021   Q1: Little interest or pleasure in doing things 0   Q2: Feeling down, depressed or hopeless 0   PHQ-2 Score 0   Q1: Little interest or pleasure in doing things -   Q2: Feeling down, depressed or hopeless -   PHQ-2 Score -       Abuse: Current or Past(Physical, Sexual or Emotional)- NO  Do you feel safe in your environment? YES        Social History     Tobacco Use     Smoking status: Former Smoker     Packs/day: 1.00     Years: 20.00     Pack years: 20.00     Types: Cigarettes     Quit date: 10/1/2011     Years since quittin.2     Smokeless tobacco: Never Used   Substance Use Topics     Alcohol use: No     Alcohol/week: 0.0 standard drinks     If you drink alcohol do you typically have >3 drinks per day or >7 drinks per week? No    Alcohol Use 2021   Prescreen: >3 drinks/day or >7 drinks/week? No       Last PSA:   PSA   Date Value Ref Range Status   2020 0.50 0 - 4 ug/L Final     Comment:     Assay Method:  Chemiluminescence using Siemens Vista analyzer       Reviewed orders with patient. Reviewed health maintenance and updated orders accordingly - Yes  Lab work is in process  Labs reviewed in EPIC    Reviewed and updated as needed this visit by clinical staff  Tobacco  Allergies  Meds   Med Hx  Surg Hx  Fam Hx  " Soc Hx        Reviewed and updated as needed this visit by Provider                    Review of Systems  CONSTITUTIONAL: NEGATIVE for fever, chills, change in weight  INTEGUMENTARY/SKIN: NEGATIVE for worrisome rashes, moles or lesions  EYES: NEGATIVE for vision changes or irritation  ENT: NEGATIVE for ear, mouth and throat problems  RESP: NEGATIVE for significant cough or SOB  CV: NEGATIVE for chest pain, palpitations or peripheral edema  GI: NEGATIVE for nausea, abdominal pain, heartburn, or change in bowel habits   male: negative for dysuria, hematuria, decreased urinary stream, erectile dysfunction, urethral discharge  MUSCULOSKELETAL: NEGATIVE for significant arthralgias or myalgia  NEURO: NEGATIVE for weakness, dizziness or paresthesias  ENDOCRINE: NEGATIVE for temperature intolerance, skin/hair changes  HEME/ALLERGY/IMMUNE: NEGATIVE for bleeding problems  PSYCHIATRIC: NEGATIVE for changes in mood or affect    OBJECTIVE:   /80   Pulse 78   Temp 97.4  F (36.3  C) (Temporal)   Ht 1.803 m (5' 11\")   Wt 97.7 kg (215 lb 6.4 oz)   SpO2 98%   BMI 30.04 kg/m      Physical Exam  GENERAL: alert, no distress and over weight  EYES: Eyes grossly normal to inspection, PERRL and conjunctivae and sclerae normal  HENT: both ears: occluded with wax and nose and mouth without ulcers or lesions  NECK: no adenopathy, no asymmetry, masses, or scars and thyroid normal to palpation  RESP: lungs clear to auscultation - no rales, rhonchi or wheezes  CV: regular rate and rhythm, normal S1 S2, no S3 or S4, no murmur, click or rub, no peripheral edema and peripheral pulses strong  ABDOMEN: soft, nontender, no hepatosplenomegaly, no masses and bowel sounds normal  MS: no gross musculoskeletal defects noted, no edema  SKIN: no suspicious lesions or rashes  NEURO: Normal strength and tone, mentation intact and speech normal  PSYCH: mentation appears normal, affect normal/bright  LYMPH: no cervical, supraclavicular, axillary, " or inguinal adenopathy    Labs reviewed in Epic  Results for orders placed or performed in visit on 01/05/21 (from the past 24 hour(s))   Uric acid   Result Value Ref Range    Uric Acid 4.9 3.5 - 7.2 mg/dL   Basic metabolic panel   Result Value Ref Range    Sodium 140 133 - 144 mmol/L    Potassium 3.9 3.4 - 5.3 mmol/L    Chloride 109 94 - 109 mmol/L    Carbon Dioxide 26 20 - 32 mmol/L    Anion Gap 5 3 - 14 mmol/L    Glucose 107 (H) 70 - 99 mg/dL    Urea Nitrogen 21 7 - 30 mg/dL    Creatinine 1.83 (H) 0.66 - 1.25 mg/dL    GFR Estimate 39 (L) >60 mL/min/[1.73_m2]    GFR Estimate If Black 45 (L) >60 mL/min/[1.73_m2]    Calcium 8.9 8.5 - 10.1 mg/dL   UA reflex to Microscopic   Result Value Ref Range    Color Urine Yellow     Appearance Urine Clear     Glucose Urine Negative NEG^Negative mg/dL    Bilirubin Urine Negative NEG^Negative    Ketones Urine Negative NEG^Negative mg/dL    Specific Gravity Urine 1.010 1.003 - 1.035    Blood Urine Negative NEG^Negative    pH Urine 6.0 5.0 - 7.0 pH    Protein Albumin Urine Negative NEG^Negative mg/dL    Urobilinogen Urine 0.2 0.2 - 1.0 EU/dL    Nitrite Urine Negative NEG^Negative    Leukocyte Esterase Urine Negative NEG^Negative    Source Midstream Urine        ASSESSMENT/PLAN:   1. Routine general medical examination at a health care facility  Updated screening, immunizations, prevention.  Please see health maintenance list, care gaps     2. Stage 3a chronic kidney disease  Related to lithium use in past- stable. Avoid nsaids.   - UA reflex to Microscopic  - Albumin Random Urine Quantitative with Creat Ratio  - Basic metabolic panel    3. Chronic gout of multiple sites, unspecified cause  No episodes on allopurinol. Cont meds  - Uric acid  - allopurinol (ZYLOPRIM) 300 MG tablet; Take 1 tablet (300 mg) by mouth daily  Dispense: 90 tablet; Refill: 3    4. Gastroesophageal reflux disease without esophagitis since 2010 and worse since 4-16  - omeprazole (PRILOSEC) 20 MG DR capsule;  "Take 1 capsule (20 mg) by mouth daily  Dispense: 90 capsule; Refill: 3    5. Bilateral impacted cerumen  Manual disimpaction with curette performed by MD   - REMOVE IMPACTED CERUMEN    Patient has been advised of split billing requirements and indicates understanding: Yes  COUNSELING:   Reviewed preventive health counseling, as reflected in patient instructions    Estimated body mass index is 30.04 kg/m  as calculated from the following:    Height as of this encounter: 1.803 m (5' 11\").    Weight as of this encounter: 97.7 kg (215 lb 6.4 oz).     Weight management plan: Discussed healthy diet and exercise guidelines    He reports that he quit smoking about 9 years ago. His smoking use included cigarettes. He has a 20.00 pack-year smoking history. He has never used smokeless tobacco.      Counseling Resources:  ATP IV Guidelines  Pooled Cohorts Equation Calculator  FRAX Risk Assessment  ICSI Preventive Guidelines  Dietary Guidelines for Americans, 2010  USDA's MyPlate  ASA Prophylaxis  Lung CA Screening    Arsh Sommer MD  [facility]  "

## 2021-03-22 ENCOUNTER — OFFICE VISIT (OUTPATIENT)
Dept: INTERNAL MEDICINE | Facility: CLINIC | Age: 61
End: 2021-03-22
Payer: MEDICAID

## 2021-03-22 VITALS
SYSTOLIC BLOOD PRESSURE: 126 MMHG | BODY MASS INDEX: 31.32 KG/M2 | HEIGHT: 71 IN | DIASTOLIC BLOOD PRESSURE: 82 MMHG | HEART RATE: 85 BPM | WEIGHT: 223.7 LBS | OXYGEN SATURATION: 97 % | TEMPERATURE: 98.2 F

## 2021-03-22 DIAGNOSIS — M17.11 PRIMARY OSTEOARTHRITIS OF RIGHT KNEE: Primary | ICD-10-CM

## 2021-03-22 PROCEDURE — 99213 OFFICE O/P EST LOW 20 MIN: CPT | Performed by: INTERNAL MEDICINE

## 2021-03-22 ASSESSMENT — MIFFLIN-ST. JEOR: SCORE: 1846.83

## 2021-03-22 NOTE — PROGRESS NOTES
"    Assessment & Plan     Primary osteoarthritis of right knee  S/p cortisone inj  Seems to be significantly improved w/this  Discussed exercise program for OA               See Patient Instructions    Return in about 1 year (around 3/22/2022) for Preventative Visit.    Arsh Sommer MD  [facility] RUTHY Garcia is a 61 year old who presents for the following health issues     HPI     ED/UC Followup:    Facility:  [facility]  Date of visit: 03/11/2021  Reason for visit: knee pain  Current Status: improvement           Review of Systems         Objective    /82   Pulse 85   Temp 98.2  F (36.8  C) (Temporal)   Ht 1.803 m (5' 11\")   Wt 101.5 kg (223 lb 11.2 oz)   SpO2 97%   BMI 31.20 kg/m    Body mass index is 31.2 kg/m .  Physical Exam                     "

## 2021-03-22 NOTE — PATIENT INSTRUCTIONS
TYLENOL OR ACETAMINOPHEN IS OK    NO IBUPROFEN OR NAPROXEN (NSAIDS)- BAD FOR KIDNEYS    KNEE EXERCISES:    [email or web address]-[email or web address]-exercises-[email or web address]

## 2021-12-14 DIAGNOSIS — M1A.09X0 CHRONIC GOUT OF MULTIPLE SITES, UNSPECIFIED CAUSE: ICD-10-CM

## 2021-12-14 DIAGNOSIS — K21.9 GASTROESOPHAGEAL REFLUX DISEASE WITHOUT ESOPHAGITIS: ICD-10-CM

## 2021-12-16 RX ORDER — ALLOPURINOL 300 MG/1
TABLET ORAL
Qty: 30 TABLET | Refills: 1 | Status: SHIPPED | OUTPATIENT
Start: 2021-12-16 | End: 2022-02-10

## 2021-12-16 NOTE — TELEPHONE ENCOUNTER
Prescription approved per [facility] Refill Protocol.  Isabell Wiggins RN

## 2021-12-16 NOTE — TELEPHONE ENCOUNTER
Routing refill request to provider for review/approval because:  Labs not current:    Lab Results   Component Value Date    WBC 4.2 06/04/2019     Lab Results   Component Value Date    RBC 4.24 06/04/2019     Lab Results   Component Value Date    HGB 13.6 06/04/2019     Lab Results   Component Value Date    HCT 38.6 06/04/2019     No components found for: MCT  Lab Results   Component Value Date    MCV 91 06/04/2019     Lab Results   Component Value Date    MCH 32.1 06/04/2019     Lab Results   Component Value Date    MCHC 35.2 06/04/2019     Lab Results   Component Value Date    RDW 14.3 06/04/2019     Lab Results   Component Value Date     06/04/2019     Lab Results   Component Value Date    ALT 37 06/04/2019     Creatinine   Date Value Ref Range Status   01/05/2021 1.83 (H) 0.66 - 1.25 mg/dL Final       Isabell Wiggins RN

## 2022-02-08 DIAGNOSIS — M1A.09X0 CHRONIC GOUT OF MULTIPLE SITES, UNSPECIFIED CAUSE: ICD-10-CM

## 2022-02-10 RX ORDER — ALLOPURINOL 300 MG/1
TABLET ORAL
Qty: 30 TABLET | Refills: 1 | Status: SHIPPED | OUTPATIENT
Start: 2022-02-10 | End: 2022-04-06

## 2022-02-10 NOTE — TELEPHONE ENCOUNTER
Routing refill request to provider for review/approval because:   CBC on file in past 12 months    ALT on file in past 12 months    Has Uric Acid on file in past 12 months and value is less than 6    Normal serum creatinine on file in the past 12 months     Labs pended.     Zhane Christian RN

## 2022-03-07 DIAGNOSIS — K21.9 GASTROESOPHAGEAL REFLUX DISEASE WITHOUT ESOPHAGITIS: ICD-10-CM

## 2022-03-08 NOTE — TELEPHONE ENCOUNTER
Medication filled one time only as pt is due for a follow-up for further refills.  Pharmacy has been notified to inform patient to call clinic and schedule appointment.  Zhane Christian RN

## 2022-04-04 DIAGNOSIS — M1A.09X0 CHRONIC GOUT OF MULTIPLE SITES, UNSPECIFIED CAUSE: ICD-10-CM

## 2022-04-05 NOTE — TELEPHONE ENCOUNTER
Routing refill request to provider for review/approval because:  Failed protocol due to :    CBC on file in past 12 months    ALT on file in past 12 months    Has Uric Acid on file in past 12 months and value is less than 6    Recent (12 mo) or future (30 days) visit within the authorizing provider's specialty    Normal serum creatinine on file in the past 12 months     Called and scheduled patient for annual wellness visit May 17, 2022-pt planning to come fasting for labs.     Zhane Christian RN

## 2022-04-06 RX ORDER — ALLOPURINOL 300 MG/1
TABLET ORAL
Qty: 60 TABLET | Refills: 0 | Status: SHIPPED | OUTPATIENT
Start: 2022-04-06 | End: 2022-07-15

## 2022-06-01 DIAGNOSIS — K21.9 GASTROESOPHAGEAL REFLUX DISEASE WITHOUT ESOPHAGITIS: ICD-10-CM

## 2022-06-01 DIAGNOSIS — M1A.09X0 CHRONIC GOUT OF MULTIPLE SITES, UNSPECIFIED CAUSE: ICD-10-CM

## 2022-06-01 NOTE — LETTER
June 6, 2022    Hugo Zepeda  [address] [place] [address]        Dear Jose,    While reviewing your prescription refill request, we noticed that you are due to have a IN PERSON visit with your Provider.  Tthat appointment must be made before any additional refills can be approved.     Taking care of your health is important to us and we look forward to seeing you in the near future.  Please call us at [phone] or [phone] (or use [email or web address]) to schedule.  Please disregard this notice if you have already made an appointment.        Sincerely,          [facility]

## 2022-06-02 NOTE — TELEPHONE ENCOUNTER
Patient Contact    Attempt # [phone]    Voicemail left regarding: Due for Visit with call back number [phone].

## 2022-06-03 RX ORDER — ALLOPURINOL 300 MG/1
TABLET ORAL
Qty: 30 TABLET | OUTPATIENT
Start: 2022-06-03

## 2022-06-03 NOTE — TELEPHONE ENCOUNTER
Routing refill request to provider for review/approval because:  Patient needs to be seen because it has been more than 1 year since last office visit.    Was called and scheduled 5/17 during last refill but visit was cancelled     Aashish Bocanegra RN  [facility] Triage Nurse

## 2022-07-13 DIAGNOSIS — M1A.09X0 CHRONIC GOUT OF MULTIPLE SITES, UNSPECIFIED CAUSE: ICD-10-CM

## 2022-07-13 DIAGNOSIS — K21.9 GASTROESOPHAGEAL REFLUX DISEASE WITHOUT ESOPHAGITIS: ICD-10-CM

## 2022-07-13 NOTE — LETTER
[facility]  [address] [id number] [phone]            Hugo Zepeda  [facility] [place] [address]        July 21, 2022    Dear Jose,    While refilling your prescription today, we noticed that you are due for an appointment with your provider.  We will refill your prescription for 30 days, but a follow-up appointment must be made before any additional refills can be approved.     Taking care of your health is important to us and we look forward to seeing you in the near future.  Please call us at [phone] or [phone] (or use [facility]) to schedule an appointment.     Please disregard this notice if you have already made an appointment.    Sincerely,        [facility]

## 2022-07-15 RX ORDER — ALLOPURINOL 300 MG/1
TABLET ORAL
Qty: 30 TABLET | Refills: 2 | Status: SHIPPED | OUTPATIENT
Start: 2022-07-15 | End: 2022-11-17

## 2022-11-15 DIAGNOSIS — M1A.09X0 CHRONIC GOUT OF MULTIPLE SITES, UNSPECIFIED CAUSE: ICD-10-CM

## 2022-11-15 DIAGNOSIS — K21.9 GASTROESOPHAGEAL REFLUX DISEASE WITHOUT ESOPHAGITIS: ICD-10-CM

## 2022-11-15 NOTE — LETTER
JOHN [address] [place] [address]  [phone]  November 25, 2022  Hugo Zepeda  [address] [place] [address]    Dear Jose,    I am contacting you regarding the refill request we received for you. After reviewing your chart it looks like you are overdue for your annual and for a med check. Please call [phone] or schedule this through my chart to continue to receive refills. If you anticipate running out before your appointment let us know and we can send in a tonja refill.       Thank you,     [facility] nursing staff

## 2022-11-17 RX ORDER — ALLOPURINOL 300 MG/1
TABLET ORAL
Qty: 30 TABLET | Refills: 2 | Status: SHIPPED | OUTPATIENT
Start: 2022-11-17

## 2023-09-13 NOTE — TELEPHONE ENCOUNTER
Pending Prescriptions:                       Disp   Refills    allopurinol (ZYLOPRIM) 300 MG tablet [Phar*30 tab*2        Sig: TAKE 1 TABLET BY MOUTH DAILY    omeprazole (PRILOSEC) 20 MG DR capsule [Ph*30 cap*2        Sig: TAKE 1 CAPSULE BY MOUTH DAILY    Routing refill request to provider for review/approval because:  Allopurinol-  Labs out of range:  cbc, creat   Labs not current:  alt, uric acid  Due for OV: left     Omeprazole-due for ov - called pt, no answer, left a voicemail.     Jennifer Patterson RN     <-- Click to add NO pertinent Past Medical History